# Patient Record
Sex: MALE | Race: WHITE | NOT HISPANIC OR LATINO | Employment: FULL TIME | RURAL
[De-identification: names, ages, dates, MRNs, and addresses within clinical notes are randomized per-mention and may not be internally consistent; named-entity substitution may affect disease eponyms.]

---

## 2019-10-08 ENCOUNTER — HISTORICAL (OUTPATIENT)
Dept: ADMINISTRATIVE | Facility: HOSPITAL | Age: 39
End: 2019-10-08

## 2019-10-09 LAB
LAB AP CLINICAL INFORMATION: NORMAL
LAB AP DIAGNOSIS - HISTORICAL: NORMAL
LAB AP GROSS PATHOLOGY - HISTORICAL: NORMAL
LAB AP SPECIMEN SUBMITTED - HISTORICAL: NORMAL

## 2020-06-10 ENCOUNTER — HISTORICAL (OUTPATIENT)
Dept: ADMINISTRATIVE | Facility: HOSPITAL | Age: 40
End: 2020-06-10

## 2021-04-13 DIAGNOSIS — R13.10 DYSPHAGIA, UNSPECIFIED TYPE: Primary | ICD-10-CM

## 2021-04-13 RX ORDER — PANTOPRAZOLE SODIUM 40 MG/1
40 TABLET, DELAYED RELEASE ORAL DAILY
Qty: 30 TABLET | Refills: 11 | Status: SHIPPED | OUTPATIENT
Start: 2021-04-13 | End: 2022-04-25 | Stop reason: SDUPTHER

## 2021-04-13 RX ORDER — PANTOPRAZOLE SODIUM 40 MG/1
40 TABLET, DELAYED RELEASE ORAL DAILY
COMMUNITY
Start: 2021-03-06 | End: 2021-04-13 | Stop reason: SDUPTHER

## 2021-08-09 RX ORDER — ESCITALOPRAM OXALATE 10 MG/1
10 TABLET ORAL DAILY
Qty: 90 TABLET | Refills: 0 | Status: SHIPPED | OUTPATIENT
Start: 2021-08-09 | End: 2021-10-18 | Stop reason: DRUGHIGH

## 2021-08-09 RX ORDER — ESCITALOPRAM OXALATE 10 MG/1
10 TABLET ORAL DAILY
COMMUNITY
Start: 2021-05-15 | End: 2021-08-09 | Stop reason: SDUPTHER

## 2021-10-14 PROBLEM — K21.9 GERD (GASTROESOPHAGEAL REFLUX DISEASE): Status: ACTIVE | Noted: 2018-02-26

## 2021-10-14 PROBLEM — M47.812 CERVICAL SPONDYLOSIS: Status: ACTIVE | Noted: 2020-06-10

## 2021-10-14 PROBLEM — F41.9 ANXIETY: Status: ACTIVE | Noted: 2018-03-16

## 2021-10-18 DIAGNOSIS — F41.9 ANXIETY: Primary | ICD-10-CM

## 2021-10-18 RX ORDER — ESCITALOPRAM OXALATE 10 MG/1
10 TABLET ORAL DAILY
Qty: 90 TABLET | Refills: 0 | Status: CANCELLED | OUTPATIENT
Start: 2021-10-18

## 2021-10-18 RX ORDER — ESCITALOPRAM OXALATE 20 MG/1
20 TABLET ORAL DAILY
Qty: 90 TABLET | Refills: 0 | Status: SHIPPED | OUTPATIENT
Start: 2021-10-18 | End: 2021-12-06 | Stop reason: SDUPTHER

## 2021-11-23 ENCOUNTER — HOSPITAL ENCOUNTER (OUTPATIENT)
Dept: RADIOLOGY | Facility: HOSPITAL | Age: 41
Discharge: HOME | End: 2021-11-23
Attending: NURSE PRACTITIONER
Payer: COMMERCIAL

## 2021-11-23 ENCOUNTER — OFFICE VISIT (OUTPATIENT)
Dept: FAMILY MEDICINE | Facility: CLINIC | Age: 41
End: 2021-11-23
Payer: COMMERCIAL

## 2021-11-23 ENCOUNTER — TELEPHONE (OUTPATIENT)
Dept: FAMILY MEDICINE | Facility: CLINIC | Age: 41
End: 2021-11-23
Payer: COMMERCIAL

## 2021-11-23 VITALS
WEIGHT: 256.38 LBS | BODY MASS INDEX: 31.22 KG/M2 | HEART RATE: 65 BPM | HEIGHT: 76 IN | SYSTOLIC BLOOD PRESSURE: 138 MMHG | DIASTOLIC BLOOD PRESSURE: 88 MMHG | RESPIRATION RATE: 17 BRPM | TEMPERATURE: 98 F | OXYGEN SATURATION: 98 %

## 2021-11-23 DIAGNOSIS — S29.9XXA RIB INJURY: ICD-10-CM

## 2021-11-23 DIAGNOSIS — S29.9XXA RIB INJURY: Primary | ICD-10-CM

## 2021-11-23 DIAGNOSIS — R07.81 RIB PAIN ON RIGHT SIDE: ICD-10-CM

## 2021-11-23 PROCEDURE — 71101 XR RIBS MIN 3 VIEWS W/ PA CHEST RIGHT: ICD-10-PCS | Mod: 26,RT,, | Performed by: RADIOLOGY

## 2021-11-23 PROCEDURE — 99202 PR OFFICE/OUTPT VISIT, NEW, LEVL II, 15-29 MIN: ICD-10-PCS | Mod: ,,, | Performed by: NURSE PRACTITIONER

## 2021-11-23 PROCEDURE — 71101 X-RAY EXAM UNILAT RIBS/CHEST: CPT | Mod: 26,RT,, | Performed by: RADIOLOGY

## 2021-11-23 PROCEDURE — 99202 OFFICE O/P NEW SF 15 MIN: CPT | Mod: ,,, | Performed by: NURSE PRACTITIONER

## 2021-11-23 PROCEDURE — 71101 X-RAY EXAM UNILAT RIBS/CHEST: CPT | Mod: TC,RT

## 2021-11-23 RX ORDER — TIZANIDINE 4 MG/1
4 TABLET ORAL EVERY 6 HOURS PRN
Qty: 30 TABLET | Refills: 0 | Status: SHIPPED | OUTPATIENT
Start: 2021-11-23 | End: 2021-12-03

## 2021-11-23 RX ORDER — DICLOFENAC SODIUM 75 MG/1
75 TABLET, DELAYED RELEASE ORAL 2 TIMES DAILY
Qty: 30 TABLET | Refills: 0 | Status: SHIPPED | OUTPATIENT
Start: 2021-11-23 | End: 2021-12-06

## 2021-12-06 ENCOUNTER — OFFICE VISIT (OUTPATIENT)
Dept: FAMILY MEDICINE | Facility: CLINIC | Age: 41
End: 2021-12-06
Payer: COMMERCIAL

## 2021-12-06 VITALS
WEIGHT: 258.63 LBS | TEMPERATURE: 97 F | RESPIRATION RATE: 20 BRPM | HEART RATE: 73 BPM | OXYGEN SATURATION: 98 % | SYSTOLIC BLOOD PRESSURE: 116 MMHG | DIASTOLIC BLOOD PRESSURE: 70 MMHG | HEIGHT: 74 IN | BODY MASS INDEX: 33.19 KG/M2

## 2021-12-06 DIAGNOSIS — J31.0 CHRONIC RHINITIS: ICD-10-CM

## 2021-12-06 DIAGNOSIS — K21.9 GASTROESOPHAGEAL REFLUX DISEASE WITHOUT ESOPHAGITIS: ICD-10-CM

## 2021-12-06 DIAGNOSIS — F41.9 ANXIETY: Primary | ICD-10-CM

## 2021-12-06 PROBLEM — S29.9XXA RIB INJURY: Status: RESOLVED | Noted: 2021-11-23 | Resolved: 2021-12-06

## 2021-12-06 PROBLEM — R07.81 RIB PAIN ON RIGHT SIDE: Status: RESOLVED | Noted: 2021-11-23 | Resolved: 2021-12-06

## 2021-12-06 PROCEDURE — 99213 OFFICE O/P EST LOW 20 MIN: CPT | Mod: ,,, | Performed by: NURSE PRACTITIONER

## 2021-12-06 PROCEDURE — 99213 PR OFFICE/OUTPT VISIT, EST, LEVL III, 20-29 MIN: ICD-10-PCS | Mod: ,,, | Performed by: NURSE PRACTITIONER

## 2021-12-06 RX ORDER — CETIRIZINE HYDROCHLORIDE 10 MG/1
10 TABLET ORAL DAILY
Qty: 90 TABLET | Refills: 3 | Status: SHIPPED | OUTPATIENT
Start: 2021-12-06 | End: 2022-10-12 | Stop reason: SDUPTHER

## 2021-12-06 RX ORDER — ESCITALOPRAM OXALATE 20 MG/1
20 TABLET ORAL DAILY
Qty: 90 TABLET | Refills: 1 | Status: SHIPPED | OUTPATIENT
Start: 2021-12-06 | End: 2022-07-20 | Stop reason: SDUPTHER

## 2022-03-24 ENCOUNTER — OFFICE VISIT (OUTPATIENT)
Dept: FAMILY MEDICINE | Facility: CLINIC | Age: 42
End: 2022-03-24
Payer: COMMERCIAL

## 2022-03-24 VITALS
RESPIRATION RATE: 20 BRPM | HEART RATE: 61 BPM | WEIGHT: 253.38 LBS | DIASTOLIC BLOOD PRESSURE: 64 MMHG | SYSTOLIC BLOOD PRESSURE: 116 MMHG | BODY MASS INDEX: 32.52 KG/M2 | OXYGEN SATURATION: 99 % | HEIGHT: 74 IN | TEMPERATURE: 98 F

## 2022-03-24 DIAGNOSIS — Z13.1 DIABETES MELLITUS SCREENING: ICD-10-CM

## 2022-03-24 DIAGNOSIS — Z23 NEED FOR DIPHTHERIA-TETANUS-PERTUSSIS (TDAP) VACCINE: ICD-10-CM

## 2022-03-24 DIAGNOSIS — Z11.59 NEED FOR HEPATITIS C SCREENING TEST: ICD-10-CM

## 2022-03-24 DIAGNOSIS — Z13.220 SCREENING FOR HYPERLIPIDEMIA: ICD-10-CM

## 2022-03-24 DIAGNOSIS — Z00.00 ROUTINE GENERAL MEDICAL EXAMINATION AT A HEALTH CARE FACILITY: Primary | ICD-10-CM

## 2022-03-24 DIAGNOSIS — F41.9 ANXIETY: ICD-10-CM

## 2022-03-24 LAB
CHOLEST SERPL-MCNC: 148 MG/DL (ref 0–200)
CHOLEST/HDLC SERPL: 4.4 {RATIO}
GLUCOSE SERPL-MCNC: 100 MG/DL (ref 74–106)
HCV AB SER QL: NORMAL
HDLC SERPL-MCNC: 34 MG/DL (ref 40–60)
LDLC SERPL CALC-MCNC: 88 MG/DL
LDLC/HDLC SERPL: 2.6 {RATIO}
NONHDLC SERPL-MCNC: 114 MG/DL
TRIGL SERPL-MCNC: 129 MG/DL (ref 35–150)
VLDLC SERPL-MCNC: 26 MG/DL

## 2022-03-24 PROCEDURE — 3074F PR MOST RECENT SYSTOLIC BLOOD PRESSURE < 130 MM HG: ICD-10-PCS | Mod: ,,, | Performed by: NURSE PRACTITIONER

## 2022-03-24 PROCEDURE — 80061 LIPID PANEL: ICD-10-PCS | Mod: ,,, | Performed by: CLINICAL MEDICAL LABORATORY

## 2022-03-24 PROCEDURE — 90715 TDAP VACCINE GREATER THAN OR EQUAL TO 7YO IM: ICD-10-PCS | Mod: ,,, | Performed by: NURSE PRACTITIONER

## 2022-03-24 PROCEDURE — 3078F PR MOST RECENT DIASTOLIC BLOOD PRESSURE < 80 MM HG: ICD-10-PCS | Mod: ,,, | Performed by: NURSE PRACTITIONER

## 2022-03-24 PROCEDURE — 90471 TDAP VACCINE GREATER THAN OR EQUAL TO 7YO IM: ICD-10-PCS | Mod: ,,, | Performed by: NURSE PRACTITIONER

## 2022-03-24 PROCEDURE — 1160F RVW MEDS BY RX/DR IN RCRD: CPT | Mod: ,,, | Performed by: NURSE PRACTITIONER

## 2022-03-24 PROCEDURE — 3008F PR BODY MASS INDEX (BMI) DOCUMENTED: ICD-10-PCS | Mod: ,,, | Performed by: NURSE PRACTITIONER

## 2022-03-24 PROCEDURE — 90471 IMMUNIZATION ADMIN: CPT | Mod: ,,, | Performed by: NURSE PRACTITIONER

## 2022-03-24 PROCEDURE — 99396 PREV VISIT EST AGE 40-64: CPT | Mod: 25,,, | Performed by: NURSE PRACTITIONER

## 2022-03-24 PROCEDURE — 80061 LIPID PANEL: CPT | Mod: ,,, | Performed by: CLINICAL MEDICAL LABORATORY

## 2022-03-24 PROCEDURE — 82947 GLUCOSE, FASTING: ICD-10-PCS | Mod: ,,, | Performed by: CLINICAL MEDICAL LABORATORY

## 2022-03-24 PROCEDURE — 1160F PR REVIEW ALL MEDS BY PRESCRIBER/CLIN PHARMACIST DOCUMENTED: ICD-10-PCS | Mod: ,,, | Performed by: NURSE PRACTITIONER

## 2022-03-24 PROCEDURE — 1159F PR MEDICATION LIST DOCUMENTED IN MEDICAL RECORD: ICD-10-PCS | Mod: ,,, | Performed by: NURSE PRACTITIONER

## 2022-03-24 PROCEDURE — 82947 ASSAY GLUCOSE BLOOD QUANT: CPT | Mod: ,,, | Performed by: CLINICAL MEDICAL LABORATORY

## 2022-03-24 PROCEDURE — 86803 HEPATITIS C AB TEST: CPT | Mod: ,,, | Performed by: CLINICAL MEDICAL LABORATORY

## 2022-03-24 PROCEDURE — 1159F MED LIST DOCD IN RCRD: CPT | Mod: ,,, | Performed by: NURSE PRACTITIONER

## 2022-03-24 PROCEDURE — 86803 HEPATITIS C ANTIBODY: ICD-10-PCS | Mod: ,,, | Performed by: CLINICAL MEDICAL LABORATORY

## 2022-03-24 PROCEDURE — 3008F BODY MASS INDEX DOCD: CPT | Mod: ,,, | Performed by: NURSE PRACTITIONER

## 2022-03-24 PROCEDURE — 99396 PR PREVENTIVE VISIT,EST,40-64: ICD-10-PCS | Mod: 25,,, | Performed by: NURSE PRACTITIONER

## 2022-03-24 PROCEDURE — 3078F DIAST BP <80 MM HG: CPT | Mod: ,,, | Performed by: NURSE PRACTITIONER

## 2022-03-24 PROCEDURE — 90715 TDAP VACCINE 7 YRS/> IM: CPT | Mod: ,,, | Performed by: NURSE PRACTITIONER

## 2022-03-24 PROCEDURE — 3074F SYST BP LT 130 MM HG: CPT | Mod: ,,, | Performed by: NURSE PRACTITIONER

## 2022-03-24 NOTE — PATIENT INSTRUCTIONS
Patient Education       Yearly Physical for Adults   About this topic   Most people do not want to be sick. Having a checkup each year with your doctor is one way to help you stay healthy. You may need to see your doctor more or less often. How often you need to go to the doctor depends on your age. Your family and medical history also play a role in how often you need to go to the doctor. Going to see your doctor on a routine basis can help you find problems early or even before they start. This may make it easier to treat or cure your problem.  General   Your doctor will talk about many things during your checkup. Your doctor may ask about:  Your medical and family history.  All the drugs you are taking. Be sure to include all prescription, over the counter, and herbal supplements. Tell the doctor if you have any drug allergy. Bring a list of drugs you take with you.  How you are feeling and if you are having any problems.  Risky behaviors like smoking, drinking alcohol, using illegal drugs, not wearing seatbelts, having unprotected sex, etc.  Your doctor will do a physical exam and may check your:  Height and weight  Blood pressure  Reflexes  Memory  Vision  Hearing  Your doctor may order:  Lab tests  ECG to check your heart rhythm  X-rays  Tests or treatments based on your exam  What lifestyle changes are needed?   Your doctor may suggest you make changes to your lifestyle at this visit. The doctor may talk with you about being more active or lowering stress levels. Ask your doctor what you need to do.  What drugs may be needed?   Your doctor may order drugs or vaccines to protect you from illnesses.  What changes to diet are needed?   Talk to your doctor to see if any changes are needed to your diet.  When do I need to call the doctor?   Call your doctor if you need to learn about any test results. Together you can make a plan for more care.  Helpful tips   Make a list of questions for your doctor before you  go. This will help you remember to ask about any concerns. Write down any answers from your doctor so you can look over them after your visit.   Tell your doctor about any changes in your body or health since your last visit.  Ask your doctor about any screening tests you need.  Where can I learn more?   American Academy of Family Physicians  http://familydoctor.org/familydoctor/en/prevention-wellness/staying-healthy/healthy-living/preventive-services-for-healthy-living.printerview.html   Centers for Disease Control  http://www.cdc.gov/family/checkup/   Last Reviewed Date   2019-04-22  Consumer Information Use and Disclaimer   This information is not specific medical advice and does not replace information you receive from your health care provider. This is only a brief summary of general information. It does NOT include all information about conditions, illnesses, injuries, tests, procedures, treatments, therapies, discharge instructions or life-style choices that may apply to you. You must talk with your health care provider for complete information about your health and treatment options. This information should not be used to decide whether or not to accept your health care providers advice, instructions or recommendations. Only your health care provider has the knowledge and training to provide advice that is right for you.  Copyright   Copyright © 2021 [employer] and its affiliates and/or licensors. All rights reserved.  Patient Education       Weight Loss Tips   About this topic   More and more people are concerned about their weight. You can choose from many different programs. The goal of a weight loss program may be to cut down on calories or to lose extra weight through exercise.  Losing weight may mean changing your ideas about food. Going on a diet and losing weight does not mean starving yourself. It means cutting down on the amount of food you eat, making healthy food choices, and being  "active.  General   Ideally, you need to lose 1 to 2 pounds (0.5 to 1 kg) a week for a healthy weight loss. Losing too much weight too fast is not good. When you take in fewer calories, you will lose weight. Your ideal calorie and weight goal depends on your current age, weight, height, and personal goals. Ask your doctor or dietitian what your ideal weight is.  You need to burn 3500 calories to lose 1 pound (0.5 kg). That means cutting out 500 calories every day for 7 days. You can cut out 500 calories per day by eating or drinking fewer calories, burning them through exercise, or doing both. To lose that extra weight and stay healthy:  Take time to exercise.  Exercise regularly. Burn calories with activity and exercise. Exercise can help you lose weight and it also strengthens your muscles. Set a schedule where you will have time to do exercises. With just 30 to 60 minutes of exercise each day, you could burn 500 extra calories. Your metabolism stays elevated for a period of time after exercise.  If you don't have time for a 30 minute workout, try three 10 minute exercises each day.  If you work near your home, walk to work. Walking is a very good form of exercise.  Take a 20 minute walk each day. Walk during your lunch break. Park far away, so you have to walk more.  Take the steps instead of elevators. You will burn more calories this way.  If you have an illness, like diabetes or high blood pressure, ask your doctor how much exercise is right for you.  Choose healthy snacks.  Low calorie healthy snacks are a good thing. They help your blood sugar stay even and prevent you from overeating at meals. Choose a balanced snack, such as a small apple with 2 tablespoons (30 grams) of peanut butter.  Keep in mind, even "low calorie" foods can add up. Just because you choose low calorie foods does not mean you do not have to count the calories you eat.  Pack a few fresh fruits or a small salad to take to work or school. " Avoid buying a snack at the nearest vending machine.  When you feel thirsty, drink water. Water has no calories and is a very good thirst quencher.  Plan healthy meals.  Plan ahead. Keep a diary of foods that are low in calories. You can also make a list of meal plans for your breakfast, lunch, and dinner. Planning ahead will prevent you from eating out at a fast food place or restaurant.  Make a grocery list before shopping so you only buy food you need. Don't go to the store hungry.  Visit a dietitian. This person will help you make meal plans that will help you lose weight.  Add fiber to your meals. Adding fiber helps you to feel full for a longer amount of time.  Take care when eating out.  Choose lower fat and lower calorie meals. Try a seafood, lean meat, or vegetarian entrée.  Share a meal with a friend.  Try a salad and appetizer instead of an entree.  Ask for a to-go box when dinner is served and put half of your meal in it for a later meal.  Have fruit for dessert.  Drink water instead of other high calorie drinks.  Learn not to overeat.  Watch your portions. For example, the recommended serving size of meat is 3 ounces (90 grams). This is the size of a deck of playing cards. Two tablespoons (30 grams) of peanut butter is the size of a ping-pong ball. One medium fruit is the size of a baseball.  Use a smaller plate or glass during dinner for less calorie intake.  Try drinking a glass or two of water before eating. This may make you feel more full and help you to eat less.  Eat slowly. Take at least 30 minutes to eat. This gives time for your brain to tell your stomach you are full. This will help you avoid overeating.  Some people eat smaller meals more often to help not to overeat. If you can eat six small meals, make them healthy and low calorie. If three meals are best for you, know your calorie level for the day and spread it out into three healthy low calorie meals.     What will the results be?    Losing weight may make you healthier. You also may have more energy for your daily activities. You may lower disease risk. You may also add years to your life.  What changes to diet are needed?   Learn how to read nutrition labels. Know the serving size. Knowing the calories in an item will help you make healthier choices and lose weight.  Keep a diary of the food you eat. This will help you count the calories you are taking in.  Make a menu in advance. This will help you make good choices to include in your diet.  Avoid eating 2 hours before bedtime to allow for digestion. If you eat right before you go to bed, you may also have worse heartburn.  Be sure to count the calories in the things you drink. You may want to stop drinking soda pop, beer, wine, and mixed drinks (alcohol). Some coffee drinks also have a lot of calories in them.  Who should use this diet?   A weight loss diet may be needed for people with a calculated body mass index of 25 and over. This means you are overweight or obese.  Who should not use this diet?   People with BMI of 18.5 or lower should not use this diet. Do not use this diet if your doctor does not recommend weight loss.  What foods are good to eat?   Choose foods that are nutritious. Remember, portion control is key. Even a low calorie food can become high in calories if you have too big of a serving. Here is a list of foods that are good to eat:  Vegetables:   Broccoli  Asparagus  Spinach  Green leafy vegetables  Tomatoes  Onions  Mushrooms  Cucumbers  Zucchini  Lean proteins:   Egg whites  Beans including kidney, navy, black, and chickpeas  Grilled, broiled, or baked skinless chicken breast  Grilled, broiled, or baked skinless turkey breast  Lean beef  [place] meat  Grilled, broiled, or baked fish  Beans  Nuts, such as almonds, cashews, and pistachios  Seeds  Whole grains and carbs:   Oatmeal  Brown rice  Sweet potatoes  Cereal  Whole grain bread or pasta  Fruits:    Apples  Grapefruit  Blueberries  Oranges  Bananas  Grapes  Peaches  Pineapple  Strawberries  Dairy:   Fat-free or low-fat milk and cheese  Low fat yogurt  Soy, rice, or almond milk  What foods should be limited or avoided?   Limit or avoid foods that are high in calories like:  Junk foods  Fried foods  Fatty foods  Processed meats  Food with saturated and trans fat  Whole fat dairy products  Butter  Cheese  Ice cream  Food and drinks with a lot of sugar. Some examples are beer, wine, mixed drinks (alcohol), carbonated sodas, cakes, and cookies.  When do I need to call the doctor?   Weakness  Fast heartbeat  Dizziness  Helpful tips   Join a support group and an exercise group. It is much easier to lose weight if you have support and encouragement.  Do not skip meals. If you skip a meal, you most likely will overeat at that next meal.  Eat at the dining room table instead in front of the TV to help monitor intake.  Where can I learn more?   Academy of Nutrition and Dietetics  https://www.eatright.org/health/weight-loss/your-health-and-your-weight/back-to-basics-for-healthy-weight-loss   Centers for Disease Control and Prevention  https://www.cdc.gov/healthyweight/   Weight-Control Information Network  https://www.niddk.nih.gov/health-information/diet-nutrition/changing-habits-better-health   Last Reviewed Date   2021-08-09  Consumer Information Use and Disclaimer   This information is not specific medical advice and does not replace information you receive from your health care provider. This is only a brief summary of general information. It does NOT include all information about conditions, illnesses, injuries, tests, procedures, treatments, therapies, discharge instructions or life-style choices that may apply to you. You must talk with your health care provider for complete information about your health and treatment options. This information should not be used to decide whether or not to accept your health care  providers advice, instructions or recommendations. Only your health care provider has the knowledge and training to provide advice that is right for you.  Copyright   Copyright © 2021 [employer] and its affiliates and/or licensors. All rights reserved.

## 2022-03-24 NOTE — PROGRESS NOTES
[facility] - FAMILY MEDICINE       PATIENT NAME: Gerald Montague   : 1980    AGE: 42 y.o. DATE: 2022    MRN: [id number]        Reason for Visit / Chief Complaint:  Annual Exam (Pt presents for [facility]. He is not fasting.), Anxiety, and Gastroesophageal Reflux     Subjective:     HPI:  Presents for [facility] wellness visit.  F/u anxiety and GERD  Doing well with higher dose of lexapro.    PHQ9 3/24/2022   Total Score 2       Review of Systems:     Review of Systems   Constitutional: Negative.    HENT: Negative.    Eyes: Negative.    Respiratory: Negative.    Cardiovascular: Negative.    Gastrointestinal: Negative.    Endocrine: Negative.    Genitourinary: Negative.    Musculoskeletal: Negative.    Skin: Negative.    Allergic/Immunologic: Negative.    Neurological: Negative.    Hematological: Negative.    Psychiatric/Behavioral: Negative.        Allergies and Medications:     Review of patient's allergies indicates:  No Known Allergies     Current Outpatient Medications on File Prior to Visit   Medication Sig Dispense Refill    cetirizine (ZYRTEC) 10 MG tablet Take 1 tablet (10 mg total) by mouth once daily. 90 tablet 3    EScitalopram oxalate (LEXAPRO) 20 MG tablet Take 1 tablet (20 mg total) by mouth once daily. 90 tablet 1    pantoprazole (PROTONIX) 40 MG tablet Take 1 tablet (40 mg total) by mouth once daily. 30 tablet 11     No current facility-administered medications on file prior to visit.       Labs:    none    Medical/Social/Family History:     Past Medical History:   Diagnosis Date    Anxiety 2018    Cervical spondylosis 06/10/2020    noted on CT neck    Dysphagia     GERD (gastroesophageal reflux disease) 2018    Globus sensation     History of COVID-19 2020    Other long term (current) drug therapy     Smokeless tobacco use       Social History     Tobacco Use   Smoking Status Never Smoker   Smokeless  "Tobacco Current User      Social History     Substance and Sexual Activity   Alcohol Use Not Currently       Family History   Problem Relation Age of Onset    No Known Problems Mother     Diabetes Father     Hypertension Father     Arthritis Sister     No Known Problems Brother     No Known Problems Daughter     Throat cancer Maternal Grandmother     No Known Problems Maternal Grandfather     No Known Problems Paternal Grandmother     No Known Problems Paternal Grandfather       History reviewed. No pertinent surgical history.     Health Maintenance:     Immunization History   Administered Date(s) Administered    Tdap 03/24/2022      Health Maintenance Due   Topic Date Due    Hepatitis C Screening  Never done    Lipid Panel  Never done    Eye Exam  Never done     Health Maintenance Topics with due status: Not Due       Topic Last Completion Date    TETANUS VACCINE 03/24/2022       Objective:      Wt Readings from Last 3 Encounters:   03/24/22 1106 114.9 kg (253 lb 6.4 oz)   12/06/21 0958 117.3 kg (258 lb 9.6 oz)   11/23/21 1440 116.3 kg (256 lb 6.4 oz)     Vitals:    03/24/22 1106   BP: 116/64   BP Location: Right arm   Patient Position: Sitting   BP Method: Large (Manual)   Pulse: 61   Resp: 20   Temp: 97.6 °F (36.4 °C)   TempSrc: Temporal   SpO2: 99%   Weight: 114.9 kg (253 lb 6.4 oz)   Height: 6' 2" (1.88 m)     Body mass index is 32.53 kg/m².     Physical Exam:    Physical Exam  Vitals and nursing note reviewed.   Constitutional:       Appearance: Normal appearance. He is obese.   HENT:      Head: Normocephalic.      Right Ear: Tympanic membrane, ear canal and external ear normal.      Left Ear: Tympanic membrane, ear canal and external ear normal.      Nose: Nose normal.      Mouth/Throat:      Mouth: Mucous membranes are moist.      Pharynx: Oropharynx is clear.   Eyes:      Conjunctiva/sclera: Conjunctivae normal.      Pupils: Pupils are equal, round, and reactive to light.   Neck:      Thyroid: " No thyromegaly.      Vascular: Normal carotid pulses. No carotid bruit.   Cardiovascular:      Rate and Rhythm: Normal rate and regular rhythm.      Pulses: Normal pulses.      Heart sounds: Normal heart sounds.   Pulmonary:      Effort: Pulmonary effort is normal.      Breath sounds: Normal breath sounds.   Abdominal:      Palpations: Abdomen is soft.      Tenderness: There is no abdominal tenderness.   Musculoskeletal:      Cervical back: Neck supple.      Right lower leg: No edema.      Left lower leg: No edema.   Lymphadenopathy:      Cervical: No cervical adenopathy.   Skin:     General: Skin is warm and dry.   Neurological:      General: No focal deficit present.      Mental Status: He is alert and oriented to person, place, and time.   Psychiatric:         Mood and Affect: Mood normal.         Behavior: Behavior normal.          Assessment:          ICD-10-CM ICD-9-CM   1. Routine general medical examination at a health care facility  Z00.00 V70.0   2. Screening for hyperlipidemia  Z13.220 V77.91   3. Diabetes mellitus screening  Z13.1 V77.1   4. Anxiety  F41.9 300.00   5. Need for hepatitis C screening test  Z11.59 V73.89   6. Need for diphtheria-tetanus-pertussis (Tdap) vaccine  Z23 V06.1        Plan:       Routine general medical examination at a health care facility    Screening for hyperlipidemia  -     Lipid Panel; Future; Expected date: 03/24/2022    Diabetes mellitus screening  -     Glucose, Fasting; Future; Expected date: 03/24/2022    Anxiety    Need for hepatitis C screening test  -     Hepatitis C Antibody; Future; Expected date: 03/24/2022    Need for diphtheria-tetanus-pertussis (Tdap) vaccine  -     Tdap Vaccine        Current Outpatient Medications:     cetirizine (ZYRTEC) 10 MG tablet, Take 1 tablet (10 mg total) by mouth once daily., Disp: 90 tablet, Rfl: 3    EScitalopram oxalate (LEXAPRO) 20 MG tablet, Take 1 tablet (20 mg total) by mouth once daily., Disp: 90 tablet, Rfl: 1     pantoprazole (PROTONIX) 40 MG tablet, Take 1 tablet (40 mg total) by mouth once daily., Disp: 30 tablet, Rfl: 11      New & refilled meds:  Requested Prescriptions      No prescriptions requested or ordered in this encounter     Health goals to improve overall health and well-being:  BMI < 30 - lose 1-2 lbs per week, healthy/DASH diet, exercise at least 5 days per week  fasting glucose < 100  SBP < 130 & DBP < 80  LDL chol < 100  Smokeless tobacco cessation advised.  TDAP    Med refills when needed.    F/u anxiety in 6 mths. HY w/ fasting labs in 1 yr.     Future Appointments   Date Time Provider Department Center   9/29/2022  8:40 AM [facility]AN [facility]   3/27/2023  8:20 AM [facility]AN Pearce [facility]        Signature:  SUMAN Pearce [facility] - FAMILY MEDICINE    Date of encounter: 3/24/22

## 2022-04-25 DIAGNOSIS — R13.10 DYSPHAGIA, UNSPECIFIED TYPE: ICD-10-CM

## 2022-04-25 RX ORDER — PANTOPRAZOLE SODIUM 40 MG/1
40 TABLET, DELAYED RELEASE ORAL DAILY
Qty: 30 TABLET | Refills: 3 | Status: SHIPPED | OUTPATIENT
Start: 2022-04-25 | End: 2022-09-01 | Stop reason: SDUPTHER

## 2022-04-25 NOTE — TELEPHONE ENCOUNTER
Patient wife called stating that patient needs refills of protonix 40mg daily.      ----- Message from Lyric Vela sent at 4/25/2022  8:34 AM CDT -----  Out of Protonix 40mg [phone]

## 2022-07-20 DIAGNOSIS — F41.9 ANXIETY: ICD-10-CM

## 2022-07-20 RX ORDER — ESCITALOPRAM OXALATE 20 MG/1
20 TABLET ORAL DAILY
Qty: 90 TABLET | Refills: 1 | Status: SHIPPED | OUTPATIENT
Start: 2022-07-20 | End: 2022-10-12 | Stop reason: SDUPTHER

## 2022-07-20 NOTE — TELEPHONE ENCOUNTER
----- Message from Otilia Childs sent at 7/20/2022  8:46 AM CDT -----  Patient needs a refill on lexapro called into [facility]  pharmacy.   Please call patient at [phone]  if you have any questions. Thanks!

## 2022-09-01 DIAGNOSIS — R13.10 DYSPHAGIA, UNSPECIFIED TYPE: ICD-10-CM

## 2022-09-01 RX ORDER — PANTOPRAZOLE SODIUM 40 MG/1
40 TABLET, DELAYED RELEASE ORAL DAILY
Qty: 30 TABLET | Refills: 3 | Status: SHIPPED | OUTPATIENT
Start: 2022-09-01 | End: 2023-01-09 | Stop reason: SDUPTHER

## 2022-09-01 NOTE — TELEPHONE ENCOUNTER
----- Message from Lyric Vela sent at 9/1/2022  1:18 PM CDT -----  Almost out of Proton[employer] Wind  .uses  [facility]  [phone]

## 2022-10-12 ENCOUNTER — OFFICE VISIT (OUTPATIENT)
Dept: FAMILY MEDICINE | Facility: CLINIC | Age: 42
End: 2022-10-12
Payer: COMMERCIAL

## 2022-10-12 VITALS
TEMPERATURE: 98 F | HEIGHT: 74 IN | RESPIRATION RATE: 20 BRPM | WEIGHT: 265 LBS | HEART RATE: 67 BPM | BODY MASS INDEX: 34.01 KG/M2 | OXYGEN SATURATION: 98 % | SYSTOLIC BLOOD PRESSURE: 124 MMHG | DIASTOLIC BLOOD PRESSURE: 86 MMHG

## 2022-10-12 DIAGNOSIS — F41.9 ANXIETY: Primary | Chronic | ICD-10-CM

## 2022-10-12 DIAGNOSIS — J31.0 CHRONIC RHINITIS: ICD-10-CM

## 2022-10-12 DIAGNOSIS — K21.9 GASTROESOPHAGEAL REFLUX DISEASE WITHOUT ESOPHAGITIS: Chronic | ICD-10-CM

## 2022-10-12 DIAGNOSIS — Z23 NEED FOR INFLUENZA VACCINATION: ICD-10-CM

## 2022-10-12 PROCEDURE — 1160F PR REVIEW ALL MEDS BY PRESCRIBER/CLIN PHARMACIST DOCUMENTED: ICD-10-PCS | Mod: ,,, | Performed by: NURSE PRACTITIONER

## 2022-10-12 PROCEDURE — 3008F BODY MASS INDEX DOCD: CPT | Mod: ,,, | Performed by: NURSE PRACTITIONER

## 2022-10-12 PROCEDURE — 1159F MED LIST DOCD IN RCRD: CPT | Mod: ,,, | Performed by: NURSE PRACTITIONER

## 2022-10-12 PROCEDURE — 90471 FLU VACCINE (QUAD) GREATER THAN OR EQUAL TO 3YO PRESERVATIVE FREE IM: ICD-10-PCS | Mod: ,,, | Performed by: NURSE PRACTITIONER

## 2022-10-12 PROCEDURE — 90471 IMMUNIZATION ADMIN: CPT | Mod: ,,, | Performed by: NURSE PRACTITIONER

## 2022-10-12 PROCEDURE — 1159F PR MEDICATION LIST DOCUMENTED IN MEDICAL RECORD: ICD-10-PCS | Mod: ,,, | Performed by: NURSE PRACTITIONER

## 2022-10-12 PROCEDURE — 3074F PR MOST RECENT SYSTOLIC BLOOD PRESSURE < 130 MM HG: ICD-10-PCS | Mod: ,,, | Performed by: NURSE PRACTITIONER

## 2022-10-12 PROCEDURE — 90686 IIV4 VACC NO PRSV 0.5 ML IM: CPT | Mod: ,,, | Performed by: NURSE PRACTITIONER

## 2022-10-12 PROCEDURE — 1160F RVW MEDS BY RX/DR IN RCRD: CPT | Mod: ,,, | Performed by: NURSE PRACTITIONER

## 2022-10-12 PROCEDURE — 99213 PR OFFICE/OUTPT VISIT, EST, LEVL III, 20-29 MIN: ICD-10-PCS | Mod: 25,,, | Performed by: NURSE PRACTITIONER

## 2022-10-12 PROCEDURE — 3079F PR MOST RECENT DIASTOLIC BLOOD PRESSURE 80-89 MM HG: ICD-10-PCS | Mod: ,,, | Performed by: NURSE PRACTITIONER

## 2022-10-12 PROCEDURE — 3008F PR BODY MASS INDEX (BMI) DOCUMENTED: ICD-10-PCS | Mod: ,,, | Performed by: NURSE PRACTITIONER

## 2022-10-12 PROCEDURE — 90686 FLU VACCINE (QUAD) GREATER THAN OR EQUAL TO 3YO PRESERVATIVE FREE IM: ICD-10-PCS | Mod: ,,, | Performed by: NURSE PRACTITIONER

## 2022-10-12 PROCEDURE — 3074F SYST BP LT 130 MM HG: CPT | Mod: ,,, | Performed by: NURSE PRACTITIONER

## 2022-10-12 PROCEDURE — 99213 OFFICE O/P EST LOW 20 MIN: CPT | Mod: 25,,, | Performed by: NURSE PRACTITIONER

## 2022-10-12 PROCEDURE — 3079F DIAST BP 80-89 MM HG: CPT | Mod: ,,, | Performed by: NURSE PRACTITIONER

## 2022-10-12 RX ORDER — ESCITALOPRAM OXALATE 20 MG/1
20 TABLET ORAL DAILY
Qty: 90 TABLET | Refills: 1 | Status: SHIPPED | OUTPATIENT
Start: 2022-10-12 | End: 2023-04-17 | Stop reason: SDUPTHER

## 2022-10-12 RX ORDER — CETIRIZINE HYDROCHLORIDE 10 MG/1
10 TABLET ORAL DAILY
Qty: 90 TABLET | Refills: 3 | Status: SHIPPED | OUTPATIENT
Start: 2022-10-12 | End: 2022-12-19 | Stop reason: SDUPTHER

## 2022-10-12 NOTE — PROGRESS NOTES
"   [facility]       PATIENT NAME: Gerald Montague   : 1980    AGE: 42 y.o. DATE OF ENCOUNTER: 10/12/22    MRN: [id number]        Reason for Visit / Chief Complaint:  Follow-up (Pt presents for 6 month anxiety follow up.) and Anxiety     Subjective:     HPI:    Presents for 6 mth f/u anxiety. No probs.  Needs refills zyrtec for chronic rhinitis.    Has gained 12 lbs in the past 6 mths    Review of Systems:     Review of Systems   Constitutional: Negative.    HENT: Negative.     Eyes: Negative.    Respiratory: Negative.     Cardiovascular: Negative.    Gastrointestinal: Negative.    Endocrine: Negative.    Genitourinary: Negative.    Musculoskeletal: Negative.    Skin: Negative.    Allergic/Immunologic: Negative.    Neurological: Negative.    Hematological: Negative.    Psychiatric/Behavioral: Negative.       Allergies:     Review of patient's allergies indicates:  No Known Allergies     Medical History:     Past Medical History:   Diagnosis Date    Anxiety 2018    Cervical spondylosis 06/10/2020    noted on CT neck    Dysphagia     GERD (gastroesophageal reflux disease) 2018    Globus sensation     History of COVID-19 2020    Other long term (current) drug therapy     Smokeless tobacco use       Social History     Tobacco Use   Smoking Status Never   Smokeless Tobacco Current      History reviewed. No pertinent surgical history.     Objective:      Wt Readings from Last 3 Encounters:   10/12/22 1438 120.2 kg (265 lb)   22 1106 114.9 kg (253 lb 6.4 oz)   21 0958 117.3 kg (258 lb 9.6 oz)     Vitals:    10/12/22 1438   BP: 124/86   BP Location: Left arm   Patient Position: Sitting   BP Method: Large (Manual)   Pulse: 67   Resp: 20   Temp: 98.3 °F (36.8 °C)   TempSrc: Oral   SpO2: 98%   Weight: 120.2 kg (265 lb)   Height: 6' 2" (1.88 m)     Body mass index is 34.02 kg/m².     Physical Exam:    Physical Exam  Vitals and nursing note reviewed. "   Constitutional:       General: He is not in acute distress.     Appearance: Normal appearance.   HENT:      Head: Normocephalic.      Right Ear: Tympanic membrane, ear canal and external ear normal.      Left Ear: Tympanic membrane, ear canal and external ear normal.      Nose: Nose normal.      Mouth/Throat:      Mouth: Mucous membranes are moist.      Pharynx: Oropharynx is clear.   Eyes:      Conjunctiva/sclera: Conjunctivae normal.      Pupils: Pupils are equal, round, and reactive to light.   Neck:      Thyroid: No thyromegaly.      Vascular: Normal carotid pulses.   Cardiovascular:      Rate and Rhythm: Normal rate and regular rhythm.      Pulses: Normal pulses.      Heart sounds: Normal heart sounds.   Pulmonary:      Effort: Pulmonary effort is normal. No respiratory distress.      Breath sounds: Normal breath sounds.   Musculoskeletal:      Cervical back: Neck supple.   Lymphadenopathy:      Cervical: No cervical adenopathy.   Skin:     General: Skin is warm and dry.   Neurological:      General: No focal deficit present.      Mental Status: He is alert and oriented to person, place, and time.   Psychiatric:         Mood and Affect: Mood normal.         Behavior: Behavior normal.        Assessment:          ICD-10-CM ICD-9-CM   1. Anxiety  F41.9 300.00   2. Need for influenza vaccination  Z23 V04.81   3. Gastroesophageal reflux disease without esophagitis  K21.9 530.81   4. Chronic rhinitis  J31.0 472.0        Plan:       Anxiety  -     EScitalopram oxalate (LEXAPRO) 20 MG tablet; Take 1 tablet (20 mg total) by mouth once daily.  Dispense: 90 tablet; Refill: 1    Need for influenza vaccination  -     Influenza - Quadrivalent *Preferred* (6 months+) (PF)    Gastroesophageal reflux disease without esophagitis  Comments:  controlled w/ protonix    Chronic rhinitis  -     cetirizine (ZYRTEC) 10 MG tablet; Take 1 tablet (10 mg total) by mouth once daily.  Dispense: 90 tablet; Refill: 3      Current Outpatient  Medications:     pantoprazole (PROTONIX) 40 MG tablet, Take 1 tablet (40 mg total) by mouth once daily., Disp: 30 tablet, Rfl: 3    cetirizine (ZYRTEC) 10 MG tablet, Take 1 tablet (10 mg total) by mouth once daily., Disp: 90 tablet, Rfl: 3    EScitalopram oxalate (LEXAPRO) 20 MG tablet, Take 1 tablet (20 mg total) by mouth once daily., Disp: 90 tablet, Rfl: 1    New & refilled meds:  Requested Prescriptions     Signed Prescriptions Disp Refills    EScitalopram oxalate (LEXAPRO) 20 MG tablet 90 tablet 1     Sig: Take 1 tablet (20 mg total) by mouth once daily.    cetirizine (ZYRTEC) 10 MG tablet 90 tablet 3     Sig: Take 1 tablet (10 mg total) by mouth once daily.     Discussed preHTN with goal of SBP < 130 and DBP < 80.  DASH diet, exercise, goal to lose 1-2 lbs per week.     Current treatment plan is effective, no change in therapy.  Flu shot today    Return to clinic HY w/ fasting labs in March as scheduled; and f/u as needed.    Future Appointments   Date Time Provider Department Center   3/27/2023  8:20 AM SUMAN Pearce [facility]        Signature:  SUMAN Pearce

## 2022-12-19 ENCOUNTER — TELEPHONE (OUTPATIENT)
Dept: FAMILY MEDICINE | Facility: CLINIC | Age: 42
End: 2022-12-19
Payer: COMMERCIAL

## 2022-12-19 DIAGNOSIS — J31.0 CHRONIC RHINITIS: ICD-10-CM

## 2022-12-19 RX ORDER — CETIRIZINE HYDROCHLORIDE 10 MG/1
10 TABLET ORAL DAILY
Qty: 90 TABLET | Refills: 3 | Status: SHIPPED | OUTPATIENT
Start: 2022-12-19 | End: 2024-01-21

## 2022-12-19 NOTE — TELEPHONE ENCOUNTER
----- Message from Ivone Greenberg sent at 12/19/2022  1:20 PM CST -----  Pt need refill on [facility] sent [facility]  Melissa PT # [phone]

## 2022-12-19 NOTE — TELEPHONE ENCOUNTER
Sent 1 yr refills in October but sent it again in case it didn't go through.  Msg to HP to let pt know.

## 2023-01-09 DIAGNOSIS — R13.10 DYSPHAGIA, UNSPECIFIED TYPE: ICD-10-CM

## 2023-01-09 RX ORDER — PANTOPRAZOLE SODIUM 40 MG/1
40 TABLET, DELAYED RELEASE ORAL DAILY
Qty: 30 TABLET | Refills: 3 | Status: SHIPPED | OUTPATIENT
Start: 2023-01-09 | End: 2023-05-31 | Stop reason: SDUPTHER

## 2023-01-09 NOTE — TELEPHONE ENCOUNTER
----- Message from Lyric Vela sent at 1/9/2023  8:26 AM CST -----  Out of [facility], send to [facility] [place]

## 2023-04-17 DIAGNOSIS — F41.9 ANXIETY: Chronic | ICD-10-CM

## 2023-04-17 RX ORDER — ESCITALOPRAM OXALATE 20 MG/1
20 TABLET ORAL DAILY
Qty: 90 TABLET | Refills: 1 | Status: SHIPPED | OUTPATIENT
Start: 2023-04-17 | End: 2023-10-05

## 2023-04-17 NOTE — TELEPHONE ENCOUNTER
----- Message from Riddhi Rodriguez sent at 4/17/2023  8:32 AM CDT -----  LEXAPRO W/M [facility] PT [phone]

## 2023-05-31 DIAGNOSIS — K21.9 GASTROESOPHAGEAL REFLUX DISEASE WITHOUT ESOPHAGITIS: Primary | Chronic | ICD-10-CM

## 2023-05-31 DIAGNOSIS — R13.10 DYSPHAGIA, UNSPECIFIED TYPE: ICD-10-CM

## 2023-05-31 RX ORDER — PANTOPRAZOLE SODIUM 40 MG/1
40 TABLET, DELAYED RELEASE ORAL DAILY
Qty: 90 TABLET | Refills: 0 | Status: SHIPPED | OUTPATIENT
Start: 2023-05-31 | End: 2023-08-28 | Stop reason: SDUPTHER

## 2023-07-26 ENCOUNTER — PATIENT OUTREACH (OUTPATIENT)
Dept: ADMINISTRATIVE | Facility: HOSPITAL | Age: 43
End: 2023-07-26

## 2023-08-28 DIAGNOSIS — K21.9 GASTROESOPHAGEAL REFLUX DISEASE WITHOUT ESOPHAGITIS: Chronic | ICD-10-CM

## 2023-08-28 RX ORDER — PANTOPRAZOLE SODIUM 40 MG/1
40 TABLET, DELAYED RELEASE ORAL DAILY
Qty: 90 TABLET | Refills: 0 | Status: SHIPPED | OUTPATIENT
Start: 2023-08-28 | End: 2023-11-20 | Stop reason: SDUPTHER

## 2023-08-28 NOTE — TELEPHONE ENCOUNTER
Sent 90 days but he needs an appointment asap.  I have not seen him since October 2022 and he has missed multiple appointments since then due to cancellations or no-show.

## 2023-08-28 NOTE — TELEPHONE ENCOUNTER
----- Message from Ivone Greenberg sent at 8/28/2023  9:43 AM CDT -----  PT need refill on [facility] Pt # [phone]

## 2023-10-05 DIAGNOSIS — F41.9 ANXIETY: Chronic | ICD-10-CM

## 2023-10-05 RX ORDER — ESCITALOPRAM OXALATE 20 MG/1
20 TABLET ORAL DAILY
Qty: 30 TABLET | Refills: 0 | Status: SHIPPED | OUTPATIENT
Start: 2023-10-05 | End: 2023-11-20 | Stop reason: SDUPTHER

## 2023-11-20 ENCOUNTER — OFFICE VISIT (OUTPATIENT)
Dept: FAMILY MEDICINE | Facility: CLINIC | Age: 43
End: 2023-11-20
Payer: COMMERCIAL

## 2023-11-20 VITALS
DIASTOLIC BLOOD PRESSURE: 82 MMHG | HEART RATE: 60 BPM | RESPIRATION RATE: 20 BRPM | SYSTOLIC BLOOD PRESSURE: 138 MMHG | OXYGEN SATURATION: 99 % | WEIGHT: 237.63 LBS | TEMPERATURE: 98 F | HEIGHT: 74 IN | BODY MASS INDEX: 30.5 KG/M2

## 2023-11-20 DIAGNOSIS — K21.9 GASTROESOPHAGEAL REFLUX DISEASE WITHOUT ESOPHAGITIS: Chronic | ICD-10-CM

## 2023-11-20 DIAGNOSIS — Z13.220 SCREENING FOR HYPERLIPIDEMIA: ICD-10-CM

## 2023-11-20 DIAGNOSIS — Z13.1 DIABETES MELLITUS SCREENING: ICD-10-CM

## 2023-11-20 DIAGNOSIS — F41.9 ANXIETY: Chronic | ICD-10-CM

## 2023-11-20 DIAGNOSIS — G25.81 RESTLESS LEGS: Primary | ICD-10-CM

## 2023-11-20 DIAGNOSIS — Z79.899 ENCOUNTER FOR LONG-TERM (CURRENT) USE OF OTHER MEDICATIONS: ICD-10-CM

## 2023-11-20 LAB
ALBUMIN SERPL BCP-MCNC: 4.4 G/DL (ref 3.5–5)
ALBUMIN/GLOB SERPL: 1.2 {RATIO}
ALP SERPL-CCNC: 71 U/L (ref 45–115)
ALT SERPL W P-5'-P-CCNC: 35 U/L (ref 16–61)
ANION GAP SERPL CALCULATED.3IONS-SCNC: 6 MMOL/L (ref 7–16)
AST SERPL W P-5'-P-CCNC: 22 U/L (ref 15–37)
BASOPHILS # BLD AUTO: 0.05 K/UL (ref 0–0.2)
BASOPHILS NFR BLD AUTO: 0.7 % (ref 0–1)
BILIRUB SERPL-MCNC: 0.7 MG/DL (ref ?–1.2)
BUN SERPL-MCNC: 12 MG/DL (ref 7–18)
BUN/CREAT SERPL: 12 (ref 6–20)
CALCIUM SERPL-MCNC: 9.2 MG/DL (ref 8.5–10.1)
CHLORIDE SERPL-SCNC: 105 MMOL/L (ref 98–107)
CHOLEST SERPL-MCNC: 154 MG/DL (ref 0–200)
CHOLEST/HDLC SERPL: 5.9 {RATIO}
CO2 SERPL-SCNC: 31 MMOL/L (ref 21–32)
CREAT SERPL-MCNC: 0.97 MG/DL (ref 0.7–1.3)
DIFFERENTIAL METHOD BLD: ABNORMAL
EGFR (NO RACE VARIABLE) (RUSH/TITUS): 99 ML/MIN/1.73M2
EOSINOPHIL # BLD AUTO: 0.08 K/UL (ref 0–0.5)
EOSINOPHIL NFR BLD AUTO: 1.1 % (ref 1–4)
ERYTHROCYTE [DISTWIDTH] IN BLOOD BY AUTOMATED COUNT: 14 % (ref 11.5–14.5)
EST. AVERAGE GLUCOSE BLD GHB EST-MCNC: 120 MG/DL
FERRITIN SERPL-MCNC: 313 NG/ML (ref 26–388)
GLOBULIN SER-MCNC: 3.6 G/DL (ref 2–4)
GLUCOSE SERPL-MCNC: 110 MG/DL (ref 74–106)
HBA1C MFR BLD HPLC: 5.8 % (ref 4.5–6.6)
HCT VFR BLD AUTO: 49 % (ref 40–54)
HDLC SERPL-MCNC: 26 MG/DL (ref 40–60)
HGB BLD-MCNC: 16.6 G/DL (ref 13.5–18)
IMM GRANULOCYTES # BLD AUTO: 0.03 K/UL (ref 0–0.04)
IMM GRANULOCYTES NFR BLD: 0.4 % (ref 0–0.4)
IRON SATN MFR SERPL: 30 % (ref 14–50)
IRON SERPL-MCNC: 97 ΜG/DL (ref 65–175)
LDLC SERPL CALC-MCNC: 90 MG/DL
LDLC/HDLC SERPL: 3.5 {RATIO}
LYMPHOCYTES # BLD AUTO: 2.25 K/UL (ref 1–4.8)
LYMPHOCYTES NFR BLD AUTO: 31.4 % (ref 27–41)
MCH RBC QN AUTO: 29.4 PG (ref 27–31)
MCHC RBC AUTO-ENTMCNC: 33.9 G/DL (ref 32–36)
MCV RBC AUTO: 86.7 FL (ref 80–96)
MONOCYTES # BLD AUTO: 0.62 K/UL (ref 0–0.8)
MONOCYTES NFR BLD AUTO: 8.6 % (ref 2–6)
MPC BLD CALC-MCNC: 10.5 FL (ref 9.4–12.4)
NEUTROPHILS # BLD AUTO: 4.14 K/UL (ref 1.8–7.7)
NEUTROPHILS NFR BLD AUTO: 57.8 % (ref 53–65)
NONHDLC SERPL-MCNC: 128 MG/DL
NRBC # BLD AUTO: 0 X10E3/UL
NRBC, AUTO (.00): 0 %
PLATELET # BLD AUTO: 247 K/UL (ref 150–400)
POTASSIUM SERPL-SCNC: 4.4 MMOL/L (ref 3.5–5.1)
PROT SERPL-MCNC: 8 G/DL (ref 6.4–8.2)
RBC # BLD AUTO: 5.65 M/UL (ref 4.6–6.2)
SODIUM SERPL-SCNC: 138 MMOL/L (ref 136–145)
TIBC SERPL-MCNC: 328 ΜG/DL (ref 250–450)
TRIGL SERPL-MCNC: 190 MG/DL (ref 35–150)
TSH SERPL DL<=0.005 MIU/L-ACNC: 2.71 UIU/ML (ref 0.36–3.74)
VIT B12 SERPL-MCNC: 339 PG/ML (ref 193–986)
VLDLC SERPL-MCNC: 38 MG/DL
WBC # BLD AUTO: 7.17 K/UL (ref 4.5–11)

## 2023-11-20 PROCEDURE — 99214 OFFICE O/P EST MOD 30 MIN: CPT | Mod: ,,, | Performed by: NURSE PRACTITIONER

## 2023-11-20 PROCEDURE — 1159F PR MEDICATION LIST DOCUMENTED IN MEDICAL RECORD: ICD-10-PCS | Mod: CPTII,,, | Performed by: NURSE PRACTITIONER

## 2023-11-20 PROCEDURE — 99214 PR OFFICE/OUTPT VISIT, EST, LEVL IV, 30-39 MIN: ICD-10-PCS | Mod: ,,, | Performed by: NURSE PRACTITIONER

## 2023-11-20 PROCEDURE — 82728 ASSAY OF FERRITIN: CPT | Mod: ,,, | Performed by: CLINICAL MEDICAL LABORATORY

## 2023-11-20 PROCEDURE — 1159F MED LIST DOCD IN RCRD: CPT | Mod: CPTII,,, | Performed by: NURSE PRACTITIONER

## 2023-11-20 PROCEDURE — 82728 FERRITIN: ICD-10-PCS | Mod: ,,, | Performed by: CLINICAL MEDICAL LABORATORY

## 2023-11-20 PROCEDURE — 1160F RVW MEDS BY RX/DR IN RCRD: CPT | Mod: CPTII,,, | Performed by: NURSE PRACTITIONER

## 2023-11-20 PROCEDURE — 80050 GENERAL HEALTH PANEL: CPT | Mod: ,,, | Performed by: CLINICAL MEDICAL LABORATORY

## 2023-11-20 PROCEDURE — 82607 VITAMIN B12: ICD-10-PCS | Mod: ,,, | Performed by: CLINICAL MEDICAL LABORATORY

## 2023-11-20 PROCEDURE — 3079F DIAST BP 80-89 MM HG: CPT | Mod: CPTII,,, | Performed by: NURSE PRACTITIONER

## 2023-11-20 PROCEDURE — 1160F PR REVIEW ALL MEDS BY PRESCRIBER/CLIN PHARMACIST DOCUMENTED: ICD-10-PCS | Mod: CPTII,,, | Performed by: NURSE PRACTITIONER

## 2023-11-20 PROCEDURE — 80061 LIPID PANEL: ICD-10-PCS | Mod: ,,, | Performed by: CLINICAL MEDICAL LABORATORY

## 2023-11-20 PROCEDURE — 83036 HEMOGLOBIN GLYCOSYLATED A1C: CPT | Mod: ,,, | Performed by: CLINICAL MEDICAL LABORATORY

## 2023-11-20 PROCEDURE — 82607 VITAMIN B-12: CPT | Mod: ,,, | Performed by: CLINICAL MEDICAL LABORATORY

## 2023-11-20 PROCEDURE — 83550 IRON AND TIBC: ICD-10-PCS | Mod: ,,, | Performed by: CLINICAL MEDICAL LABORATORY

## 2023-11-20 PROCEDURE — 3008F BODY MASS INDEX DOCD: CPT | Mod: CPTII,,, | Performed by: NURSE PRACTITIONER

## 2023-11-20 PROCEDURE — 83540 ASSAY OF IRON: CPT | Mod: ,,, | Performed by: CLINICAL MEDICAL LABORATORY

## 2023-11-20 PROCEDURE — 3008F PR BODY MASS INDEX (BMI) DOCUMENTED: ICD-10-PCS | Mod: CPTII,,, | Performed by: NURSE PRACTITIONER

## 2023-11-20 PROCEDURE — 80061 LIPID PANEL: CPT | Mod: ,,, | Performed by: CLINICAL MEDICAL LABORATORY

## 2023-11-20 PROCEDURE — 3079F PR MOST RECENT DIASTOLIC BLOOD PRESSURE 80-89 MM HG: ICD-10-PCS | Mod: CPTII,,, | Performed by: NURSE PRACTITIONER

## 2023-11-20 PROCEDURE — 83036 HEMOGLOBIN A1C: ICD-10-PCS | Mod: ,,, | Performed by: CLINICAL MEDICAL LABORATORY

## 2023-11-20 PROCEDURE — 3075F PR MOST RECENT SYSTOLIC BLOOD PRESS GE 130-139MM HG: ICD-10-PCS | Mod: CPTII,,, | Performed by: NURSE PRACTITIONER

## 2023-11-20 PROCEDURE — 80050 PR  GENERAL HEALTH PANEL: ICD-10-PCS | Mod: ,,, | Performed by: CLINICAL MEDICAL LABORATORY

## 2023-11-20 PROCEDURE — 83540 IRON AND TIBC: ICD-10-PCS | Mod: ,,, | Performed by: CLINICAL MEDICAL LABORATORY

## 2023-11-20 PROCEDURE — 3075F SYST BP GE 130 - 139MM HG: CPT | Mod: CPTII,,, | Performed by: NURSE PRACTITIONER

## 2023-11-20 PROCEDURE — 83550 IRON BINDING TEST: CPT | Mod: ,,, | Performed by: CLINICAL MEDICAL LABORATORY

## 2023-11-20 RX ORDER — ESCITALOPRAM OXALATE 20 MG/1
20 TABLET ORAL DAILY
Qty: 90 TABLET | Refills: 3 | Status: SHIPPED | OUTPATIENT
Start: 2023-11-20

## 2023-11-20 RX ORDER — PRAMIPEXOLE DIHYDROCHLORIDE 0.12 MG/1
0.25 TABLET ORAL NIGHTLY
Qty: 60 TABLET | Refills: 1 | Status: SHIPPED | OUTPATIENT
Start: 2023-11-20 | End: 2024-01-08

## 2023-11-20 RX ORDER — PANTOPRAZOLE SODIUM 40 MG/1
40 TABLET, DELAYED RELEASE ORAL DAILY
Qty: 90 TABLET | Refills: 3 | Status: SHIPPED | OUTPATIENT
Start: 2023-11-20

## 2023-11-20 NOTE — PROGRESS NOTES
[facility]       PATIENT NAME: Gerald Montague   : 1980    AGE: 43 y.o. DATE OF ENCOUNTER: 23    MRN: [id number]      PCP: Antonietta Marte FNP    Reason for Visit / Chief Complaint:  restless leg (Patient presents to clinic with complaints of restless leg at night times several months and refills on medications)         274}    Subjective:     HPI:    Presents as a walk-in c/o restless legs for awhile.  Reports he stands on concrete all day and in the evenings & at night his legs feel very restless and move a lot.  Also needs med refills for anxiety and GERD well controlled with current treatment.    No showed for appts 10/10/23, 23, 23, 3/27/23, and canceled in Dec 2022 & 2023.    Review of Systems:   Review of Systems   Constitutional: Negative.    HENT: Negative.     Eyes: Negative.    Respiratory: Negative.     Cardiovascular: Negative.    Gastrointestinal: Negative.    Endocrine: Negative.    Genitourinary: Negative.    Musculoskeletal: Negative.    Skin: Negative.    Allergic/Immunologic: Negative.    Neurological: Negative.         Restless legs   Hematological: Negative.    Psychiatric/Behavioral: Negative.         Allergies and Meds: 274}   Review of patient's allergies indicates:  No Known Allergies     Current Outpatient Medications:     cetirizine (ZYRTEC) 10 MG tablet, Take 1 tablet (10 mg total) by mouth once daily., Disp: 90 tablet, Rfl: 3    EScitalopram oxalate (LEXAPRO) 20 MG tablet, Take 1 tablet (20 mg total) by mouth once daily. Must have appt., Disp: 90 tablet, Rfl: 3    pantoprazole (PROTONIX) 40 MG tablet, Take 1 tablet (40 mg total) by mouth once daily., Disp: 90 tablet, Rfl: 3    pramipexole (MIRAPEX) 0.125 MG tablet, Take 2 tablets (0.25 mg total) by mouth every evening. Start with 1 tablet and can increase to 2 tabs in 1 wk if needed to control restless legs., Disp: 60 tablet, Rfl: 1    Labs:274}   I have reviewed labs  "below:  Lab Results   Component Value Date    CHOL 148 03/24/2022    TRIG 129 03/24/2022    HDL 34 (L) 03/24/2022    LDLCALC 88 03/24/2022       Medical History: 274}     Past Medical History:   Diagnosis Date    Anxiety 03/16/2018    Cervical spondylosis 06/10/2020    noted on CT neck    Dysphagia     GERD (gastroesophageal reflux disease) 02/26/2018    Globus sensation     History of COVID-19 12/17/2020    Other long term (current) drug therapy     Smokeless tobacco use       Social History     Tobacco Use   Smoking Status Never   Smokeless Tobacco Current      History reviewed. No pertinent surgical history.     Health Maintenance: 274}     Health Maintenance         Date Due Completion Date    COVID-19 Vaccine (1) Never done ---    Hemoglobin A1c (Diabetic Prevention Screening) Never done ---    HIV Screening 12/06/2027 (Originally 1/21/1995) ---    Lipid Panel 03/24/2027 3/24/2022    TETANUS VACCINE 03/24/2032 3/24/2022          Immunization History   Administered Date(s) Administered    Influenza - Quadrivalent - PF *Preferred* (6 months and older) 10/12/2022    Tdap 03/24/2022     Objective:  274}   /82 (BP Location: Left arm, Patient Position: Sitting, BP Method: Large (Automatic))   Pulse 60   Temp 97.9 °F (36.6 °C) (Oral)   Resp 20   Ht 6' 2" (1.88 m)   Wt 107.8 kg (237 lb 9.6 oz)   SpO2 99%   BMI 30.51 kg/m²     Wt Readings from Last 3 Encounters:   11/20/23 107.8 kg (237 lb 9.6 oz)   10/12/22 120.2 kg (265 lb)   03/24/22 114.9 kg (253 lb 6.4 oz)     BP Readings from Last 3 Encounters:   11/20/23 138/82   10/12/22 124/86   03/24/22 116/64     Body mass index is 30.51 kg/m².     Physical Exam  Vitals and nursing note reviewed.   Constitutional:       General: He is not in acute distress.     Appearance: Normal appearance.   HENT:      Head: Normocephalic.      Mouth/Throat:      Pharynx: Uvula midline. No posterior oropharyngeal erythema or uvula swelling.   Eyes:      Conjunctiva/sclera: " Conjunctivae normal.      Pupils: Pupils are equal, round, and reactive to light.   Neck:      Thyroid: No thyromegaly.      Vascular: Normal carotid pulses. No carotid bruit.   Cardiovascular:      Rate and Rhythm: Normal rate and regular rhythm.      Pulses: Normal pulses.      Heart sounds: Normal heart sounds.   Pulmonary:      Effort: Pulmonary effort is normal. No respiratory distress.      Breath sounds: Normal breath sounds.   Abdominal:      Palpations: Abdomen is soft.      Tenderness: There is no abdominal tenderness.   Musculoskeletal:      Cervical back: Neck supple.      Right lower leg: No edema.      Left lower leg: No edema.   Lymphadenopathy:      Cervical: No cervical adenopathy.   Skin:     General: Skin is warm and dry.   Neurological:      General: No focal deficit present.      Mental Status: He is alert and oriented to person, place, and time.   Psychiatric:         Mood and Affect: Mood normal.         Behavior: Behavior normal.          Assessment and Plan: 274}     1. Restless legs  Comments:  Ongoing for awhile and not controlled, start Mirapex and titrate up to control  Orders:  -     CBC Auto Differential; Future; Expected date: 11/20/2023  -     Comprehensive Metabolic Panel; Future; Expected date: 11/20/2023  -     TSH; Future; Expected date: 11/20/2023  -     Hemoglobin A1C; Future; Expected date: 11/20/2023  -     Vitamin B12; Future; Expected date: 11/20/2023  -     Iron and TIBC; Future; Expected date: 11/20/2023  -     Ferritin; Future; Expected date: 11/20/2023  -     pramipexole (MIRAPEX) 0.125 MG tablet; Take 2 tablets (0.25 mg total) by mouth every evening. Start with 1 tablet and can increase to 2 tabs in 1 wk if needed to control restless legs.  Dispense: 60 tablet; Refill: 1    2. Anxiety  Comments:  Controlled, continue Lexapro.  Orders:  -     EScitalopram oxalate (LEXAPRO) 20 MG tablet; Take 1 tablet (20 mg total) by mouth once daily. Must have appt.  Dispense: 90  tablet; Refill: 3    3. Gastroesophageal reflux disease without esophagitis  Comments:  Controlled, continue Protonix.  Orders:  -     pantoprazole (PROTONIX) 40 MG tablet; Take 1 tablet (40 mg total) by mouth once daily.  Dispense: 90 tablet; Refill: 3    4. Diabetes mellitus screening  -     Hemoglobin A1C; Future; Expected date: 11/20/2023    5. Encounter for long-term (current) use of other medications  -     CBC Auto Differential; Future; Expected date: 11/20/2023  -     Comprehensive Metabolic Panel; Future; Expected date: 11/20/2023  -     Lipid Panel; Future; Expected date: 11/20/2023  -     TSH; Future; Expected date: 11/20/2023  -     Hemoglobin A1C; Future; Expected date: 11/20/2023  -     Vitamin B12; Future; Expected date: 11/20/2023    6. Screening for hyperlipidemia  -     Lipid Panel; Future; Expected date: 11/20/2023       Declines flu shot, gets it at work.  Lab work-up for restless legs and other labs needed for monitoring.  Start mirapex for restless legs and titrate up to control.    Return to clinic if RLS not improving; and sooner as needed.     Signature:  SUMAN Pearce

## 2023-11-20 NOTE — LETTER
November 20, 2023    Gerald Montague  [address]  [place] [address]             [facility]  Family Medicine  [address] [place] [address]  Phone: [phone]  Fax: [phone]   November 20, 2023     Patient: Gerald Montague   YOB: 1980   Date of Visit: 11/20/2023       To Whom it May Concern:    Gerald Montague was seen in my clinic on 11/20/2023. He may return to work on 11/20/2023 .    Please excuse him from any classes or work missed.    If you have any questions or concerns, please don't hesitate to call.    Sincerely,         Antonietta Marte FNP

## 2023-11-26 PROBLEM — E78.1 ABNORMALLY LOW HIGH DENSITY LIPOPROTEIN (HDL) CHOLESTEROL WITH HYPERTRIGLYCERIDEMIA: Status: ACTIVE | Noted: 2023-11-26

## 2023-11-26 PROBLEM — E78.6 ABNORMALLY LOW HIGH DENSITY LIPOPROTEIN (HDL) CHOLESTEROL WITH HYPERTRIGLYCERIDEMIA: Status: ACTIVE | Noted: 2023-11-26

## 2023-11-26 PROBLEM — R73.03 PREDIABETES: Status: ACTIVE | Noted: 2023-11-26

## 2023-11-26 NOTE — PROGRESS NOTES
Call pt and review results. Trigs too high & HDL too low, needs to start fenofibrate 160 mg daily to prevent stroke or MI with 3 mth f/u HLD.  Very mild glucose elevation w/ predm A1c of 5.8%. Decrease sugars & white foods, lose weight; high risk to progress to T2DM w/o changes. Normal iron & B12.

## 2023-12-13 DIAGNOSIS — E78.1 ABNORMALLY LOW HIGH DENSITY LIPOPROTEIN (HDL) CHOLESTEROL WITH HYPERTRIGLYCERIDEMIA: Primary | ICD-10-CM

## 2023-12-13 DIAGNOSIS — E78.6 ABNORMALLY LOW HIGH DENSITY LIPOPROTEIN (HDL) CHOLESTEROL WITH HYPERTRIGLYCERIDEMIA: Primary | ICD-10-CM

## 2023-12-13 RX ORDER — FENOFIBRATE 160 MG/1
160 TABLET ORAL DAILY
Qty: 90 TABLET | Refills: 3 | Status: SHIPPED | OUTPATIENT
Start: 2023-12-13 | End: 2024-12-12

## 2023-12-13 NOTE — PROGRESS NOTES
Rx for fenofibrate has been sent to his pharmacy.  He needs to be scheduled for 3 mth f/u HLD w/ fasting labs.  Message sent to Ivone to call patient.

## 2024-01-08 DIAGNOSIS — G25.81 RESTLESS LEGS: ICD-10-CM

## 2024-01-08 RX ORDER — PRAMIPEXOLE DIHYDROCHLORIDE 0.12 MG/1
0.25 TABLET ORAL NIGHTLY
Qty: 180 TABLET | Refills: 1 | Status: SHIPPED | OUTPATIENT
Start: 2024-01-08

## 2024-01-08 NOTE — TELEPHONE ENCOUNTER
I got a request to refill Mirapex which is a new medication for him.  Please check with patient and see if he is taking 1 tablet or 2 so I know how to adjust his quantity for the refill.

## 2024-01-20 DIAGNOSIS — J31.0 CHRONIC RHINITIS: ICD-10-CM

## 2024-01-21 RX ORDER — CETIRIZINE HYDROCHLORIDE 10 MG/1
10 TABLET ORAL DAILY
Qty: 90 TABLET | Refills: 3 | Status: SHIPPED | OUTPATIENT
Start: 2024-01-21

## 2024-07-24 ENCOUNTER — OFFICE VISIT (OUTPATIENT)
Dept: FAMILY MEDICINE | Facility: CLINIC | Age: 44
End: 2024-07-24
Payer: COMMERCIAL

## 2024-07-24 VITALS
WEIGHT: 247.63 LBS | HEIGHT: 76 IN | DIASTOLIC BLOOD PRESSURE: 85 MMHG | TEMPERATURE: 98 F | RESPIRATION RATE: 18 BRPM | SYSTOLIC BLOOD PRESSURE: 132 MMHG | HEART RATE: 58 BPM | BODY MASS INDEX: 30.15 KG/M2 | OXYGEN SATURATION: 100 %

## 2024-07-24 DIAGNOSIS — G25.81 RESTLESS LEGS: Primary | Chronic | ICD-10-CM

## 2024-07-24 PROCEDURE — 1159F MED LIST DOCD IN RCRD: CPT | Mod: CPTII,,, | Performed by: NURSE PRACTITIONER

## 2024-07-24 PROCEDURE — 99213 OFFICE O/P EST LOW 20 MIN: CPT | Mod: ,,, | Performed by: NURSE PRACTITIONER

## 2024-07-24 PROCEDURE — 3008F BODY MASS INDEX DOCD: CPT | Mod: CPTII,,, | Performed by: NURSE PRACTITIONER

## 2024-07-24 PROCEDURE — 3079F DIAST BP 80-89 MM HG: CPT | Mod: CPTII,,, | Performed by: NURSE PRACTITIONER

## 2024-07-24 PROCEDURE — 1160F RVW MEDS BY RX/DR IN RCRD: CPT | Mod: CPTII,,, | Performed by: NURSE PRACTITIONER

## 2024-07-24 PROCEDURE — 3075F SYST BP GE 130 - 139MM HG: CPT | Mod: CPTII,,, | Performed by: NURSE PRACTITIONER

## 2024-07-24 RX ORDER — PRAMIPEXOLE DIHYDROCHLORIDE 0.5 MG/1
0.5 TABLET ORAL NIGHTLY
Qty: 90 TABLET | Refills: 1 | Status: SHIPPED | OUTPATIENT
Start: 2024-07-24

## 2024-07-24 NOTE — ASSESSMENT & PLAN NOTE
Not controlled but improved with Mirapex 0.375 mg, increase dose on his own about one-mth ago.  Increase Mirapex to 0.5 mg nightly.  If RLS not controlled with dosage increase, we will have to consider alternative treatment.

## 2024-07-24 NOTE — PROGRESS NOTES
[facility] - FAMILY MEDICINE       PATIENT NAME: Gerald Montague   : 1980    AGE: 44 y.o. DATE OF ENCOUNTER: 24    MRN: [id number]      PCP: Antonietta Marte FNP    Subjective:     Reason for Visit / Chief Complaint:     274}  Chief Complaint   Patient presents with    Restless Legs     Patient c/o increase in his restless legs and requests an increase in Mirapex.    Health Maintenance     Care gaps addressed, patient declines Covid vaccine and is otherwise up to date.    [facility]     Presents as a walk-in requesting med refill for restless legs.  Missed past appointments due to no-shows and cancellations    Review of Systems:     Review of Systems   Constitutional: Negative.    HENT: Negative.     Eyes: Negative.    Respiratory: Negative.     Cardiovascular: Negative.    Gastrointestinal: Negative.    Endocrine: Negative.    Genitourinary: Negative.    Musculoskeletal: Negative.    Skin: Negative.    Allergic/Immunologic: Negative.    Neurological: Negative.         Restless legs   Hematological: Negative.    Psychiatric/Behavioral: Negative.         Allergies and Meds: 274}     Review of patient's allergies indicates:  No Known Allergies     Current Outpatient Medications   Medication Sig Dispense Refill    cetirizine (ALLERGY RELIEF, CETIRIZINE,) 10 MG tablet Take 1 tablet (10 mg total) by mouth once daily. 90 tablet 3    EScitalopram oxalate (LEXAPRO) 20 MG tablet Take 1 tablet (20 mg total) by mouth once daily. Must have appt. 90 tablet 3    fenofibrate 160 MG Tab Take 1 tablet (160 mg total) by mouth once daily. 90 tablet 3    pantoprazole (PROTONIX) 40 MG tablet Take 1 tablet (40 mg total) by mouth once daily. 90 tablet 3    pramipexole (MIRAPEX) 0.5 MG tablet Take 1 tablet (0.5 mg total) by mouth every evening. 90 tablet 1     No current facility-administered medications for this visit.       Labs:274}   I have reviewed labs below:    Lab Results   Component  "Value Date    WBC 7.17 11/20/2023    RBC 5.65 11/20/2023    HGB 16.6 11/20/2023    HCT 49.0 11/20/2023     11/20/2023     11/20/2023    K 4.4 11/20/2023     11/20/2023    CALCIUM 9.2 11/20/2023     (H) 11/20/2023    BUN 12 11/20/2023    CREATININE 0.97 11/20/2023    ALT 35 11/20/2023    AST 22 11/20/2023    CHOL 154 11/20/2023    TRIG 190 (H) 11/20/2023    HDL 26 (L) 11/20/2023    LDLCALC 90 11/20/2023    TSH 2.710 11/20/2023    HGBA1C 5.8 11/20/2023     Lab Results   Component Value Date    FERRITIN 313 11/20/2023     Medical History: 274}     Past Medical History:   Diagnosis Date    Anxiety 03/16/2018    Cervical spondylosis 06/10/2020    noted on CT neck    Dysphagia     GERD (gastroesophageal reflux disease) 02/26/2018    Globus sensation     History of alcohol abuse     History of COVID-19 12/17/2020    Other long term (current) drug therapy     Smokeless tobacco use       Social History     Tobacco Use   Smoking Status Never   Smokeless Tobacco Current      History reviewed. No pertinent surgical history.     Health Maintenance:      Health Maintenance Topics with due status: Not Due       Topic Last Completion Date    TETANUS VACCINE 03/24/2022    Hemoglobin A1c (Prediabetes) 11/20/2023    Lipid Panel 11/20/2023       Objective:  274}   Vital Signs  Temp: 98 °F (36.7 °C)  Temp Source: Oral  Pulse: (!) 58  Resp: 18  SpO2: 100 %  BP: 132/85  BP Location: Left arm  Patient Position: Sitting  Pain Score: 0-No pain  Height and Weight  Height: 6' 4" (193 cm)  Weight: 112.3 kg (247 lb 9.6 oz)  BSA (Calculated - sq m): 2.45 sq meters  BMI (Calculated): 30.2  Weight in (lb) to have BMI = 25: 205    Over the last two weeks how often have you been bothered by little interest or pleasure in doing things: 0  Over the last two weeks how often have you been bothered by feeling down, depressed or hopeless: 0  PHQ-2 Total Score: 0  PHQ-9 Score: 0  PHQ-9 Interpretation: Minimal or None    Wt Readings " from Last 3 Encounters:   07/24/24 112.3 kg (247 lb 9.6 oz)   11/20/23 107.8 kg (237 lb 9.6 oz)   10/12/22 120.2 kg (265 lb)     Physical Exam  Vitals and nursing note reviewed.   Constitutional:       General: He is not in acute distress.     Appearance: Normal appearance. He is not ill-appearing.   HENT:      Head: Normocephalic.   Eyes:      Conjunctiva/sclera: Conjunctivae normal.   Cardiovascular:      Rate and Rhythm: Normal rate and regular rhythm.      Heart sounds: Normal heart sounds.   Pulmonary:      Effort: Pulmonary effort is normal. No respiratory distress.      Breath sounds: Normal breath sounds.   Musculoskeletal:      Right lower leg: No edema.      Left lower leg: No edema.   Skin:     General: Skin is warm and dry.      Coloration: Skin is not jaundiced or pale.   Neurological:      Mental Status: He is alert and oriented to person, place, and time.      Gait: Gait normal.   Psychiatric:         Mood and Affect: Mood normal.         Behavior: Behavior normal.         Thought Content: Thought content normal.         Judgment: Judgment normal.          Assessment and Plan: 274}     1. Restless legs  Assessment & Plan:  Not controlled but improved with Mirapex 0.375 mg, increase dose on his own about one-mth ago.  Increase Mirapex to 0.5 mg nightly.  If RLS not controlled with dosage increase, we will have to consider alternative treatment.    Orders:  -     pramipexole (MIRAPEX) 0.5 MG tablet; Take 1 tablet (0.5 mg total) by mouth every evening.  Dispense: 90 tablet; Refill: 1      Diagnosis, risks, benefits, and side effects of any meds and treatment plan were discussed with the patient.  All questions were answered to the satisfaction of the patient, and pt verbalized understanding and agreement to treatment plan.      Follow up in about 17 weeks (around 11/20/2024) for wellness, with fasting labs.    Signature:  AMRITA Pearce    Future Appointments   Date Time Provider Department  [place]   11/20/2024  7:40 AM Antonietta Marte FNP [facility]

## 2024-08-09 ENCOUNTER — LAB VISIT (OUTPATIENT)
Dept: PRIMARY CARE CLINIC | Facility: CLINIC | Age: 44
End: 2024-08-09

## 2024-08-09 DIAGNOSIS — Z02.83 ENCOUNTER FOR DRUG SCREENING: Primary | ICD-10-CM

## 2024-11-20 ENCOUNTER — OFFICE VISIT (OUTPATIENT)
Dept: FAMILY MEDICINE | Facility: CLINIC | Age: 44
End: 2024-11-20
Payer: COMMERCIAL

## 2024-11-20 VITALS
HEIGHT: 76 IN | HEART RATE: 65 BPM | WEIGHT: 248.19 LBS | RESPIRATION RATE: 18 BRPM | SYSTOLIC BLOOD PRESSURE: 117 MMHG | OXYGEN SATURATION: 99 % | DIASTOLIC BLOOD PRESSURE: 80 MMHG | BODY MASS INDEX: 30.22 KG/M2 | TEMPERATURE: 98 F

## 2024-11-20 DIAGNOSIS — Z00.00 ROUTINE GENERAL MEDICAL EXAMINATION AT A HEALTH CARE FACILITY: Primary | ICD-10-CM

## 2024-11-20 DIAGNOSIS — E78.1 ABNORMALLY LOW HIGH DENSITY LIPOPROTEIN (HDL) CHOLESTEROL WITH HYPERTRIGLYCERIDEMIA: ICD-10-CM

## 2024-11-20 DIAGNOSIS — Z13.220 SCREENING FOR HYPERLIPIDEMIA: ICD-10-CM

## 2024-11-20 DIAGNOSIS — Z13.1 DIABETES MELLITUS SCREENING: ICD-10-CM

## 2024-11-20 DIAGNOSIS — Z79.899 OTHER LONG TERM (CURRENT) DRUG THERAPY: ICD-10-CM

## 2024-11-20 DIAGNOSIS — K21.9 GASTROESOPHAGEAL REFLUX DISEASE WITHOUT ESOPHAGITIS: Chronic | ICD-10-CM

## 2024-11-20 DIAGNOSIS — F41.9 ANXIETY: Chronic | ICD-10-CM

## 2024-11-20 DIAGNOSIS — R73.03 PREDIABETES: ICD-10-CM

## 2024-11-20 DIAGNOSIS — E78.6 ABNORMALLY LOW HIGH DENSITY LIPOPROTEIN (HDL) CHOLESTEROL WITH HYPERTRIGLYCERIDEMIA: ICD-10-CM

## 2024-11-20 DIAGNOSIS — G25.81 RESTLESS LEGS: Chronic | ICD-10-CM

## 2024-11-20 LAB
ALBUMIN SERPL BCP-MCNC: 4.2 G/DL (ref 3.5–5)
ALBUMIN/GLOB SERPL: 1.2 {RATIO}
ALP SERPL-CCNC: 68 U/L (ref 40–150)
ALT SERPL W P-5'-P-CCNC: 23 U/L
ANION GAP SERPL CALCULATED.3IONS-SCNC: 12 MMOL/L (ref 7–16)
AST SERPL W P-5'-P-CCNC: 23 U/L (ref 5–34)
BILIRUB SERPL-MCNC: 0.3 MG/DL
BUN SERPL-MCNC: 19 MG/DL (ref 9–21)
BUN/CREAT SERPL: 22 (ref 6–20)
CALCIUM SERPL-MCNC: 9 MG/DL (ref 8.4–10.2)
CHLORIDE SERPL-SCNC: 105 MMOL/L (ref 98–107)
CHOLEST SERPL-MCNC: 120 MG/DL
CHOLEST/HDLC SERPL: 6.7 {RATIO}
CO2 SERPL-SCNC: 22 MMOL/L (ref 22–29)
CREAT SERPL-MCNC: 0.86 MG/DL (ref 0.72–1.25)
EGFR (NO RACE VARIABLE) (RUSH/TITUS): 109 ML/MIN/1.73M2
EST. AVERAGE GLUCOSE BLD GHB EST-MCNC: 120 MG/DL
GLOBULIN SER-MCNC: 3.5 G/DL (ref 2–4)
GLUCOSE SERPL-MCNC: 144 MG/DL (ref 74–100)
HBA1C MFR BLD HPLC: 5.8 %
HDLC SERPL-MCNC: 18 MG/DL (ref 35–60)
NONHDLC SERPL-MCNC: 102 MG/DL
POTASSIUM SERPL-SCNC: 4 MMOL/L (ref 3.5–5.1)
PROT SERPL-MCNC: 7.7 G/DL (ref 6.4–8.3)
SODIUM SERPL-SCNC: 135 MMOL/L (ref 136–145)
TRIGL SERPL-MCNC: 466 MG/DL (ref 34–140)
VLDLC SERPL-MCNC: ABNORMAL MG/DL

## 2024-11-20 PROCEDURE — 80053 COMPREHEN METABOLIC PANEL: CPT | Mod: ,,, | Performed by: CLINICAL MEDICAL LABORATORY

## 2024-11-20 PROCEDURE — 80061 LIPID PANEL: CPT | Mod: ,,, | Performed by: CLINICAL MEDICAL LABORATORY

## 2024-11-20 PROCEDURE — 99396 PREV VISIT EST AGE 40-64: CPT | Mod: ,,, | Performed by: NURSE PRACTITIONER

## 2024-11-20 PROCEDURE — 3074F SYST BP LT 130 MM HG: CPT | Mod: ,,, | Performed by: NURSE PRACTITIONER

## 2024-11-20 PROCEDURE — 3079F DIAST BP 80-89 MM HG: CPT | Mod: ,,, | Performed by: NURSE PRACTITIONER

## 2024-11-20 PROCEDURE — 1159F MED LIST DOCD IN RCRD: CPT | Mod: ,,, | Performed by: NURSE PRACTITIONER

## 2024-11-20 PROCEDURE — 83036 HEMOGLOBIN GLYCOSYLATED A1C: CPT | Mod: ,,, | Performed by: CLINICAL MEDICAL LABORATORY

## 2024-11-20 PROCEDURE — 1160F RVW MEDS BY RX/DR IN RCRD: CPT | Mod: ,,, | Performed by: NURSE PRACTITIONER

## 2024-11-20 PROCEDURE — 3008F BODY MASS INDEX DOCD: CPT | Mod: ,,, | Performed by: NURSE PRACTITIONER

## 2024-11-20 RX ORDER — PRAMIPEXOLE DIHYDROCHLORIDE 0.25 MG/1
0.75 TABLET ORAL NIGHTLY
Qty: 90 TABLET | Refills: 1 | Status: SHIPPED | OUTPATIENT
Start: 2024-11-20

## 2024-11-20 RX ORDER — ESCITALOPRAM OXALATE 20 MG/1
20 TABLET ORAL DAILY
Qty: 90 TABLET | Refills: 3 | Status: SHIPPED | OUTPATIENT
Start: 2024-11-20

## 2024-11-20 RX ORDER — PANTOPRAZOLE SODIUM 40 MG/1
40 TABLET, DELAYED RELEASE ORAL DAILY
Qty: 90 TABLET | Refills: 3 | Status: SHIPPED | OUTPATIENT
Start: 2024-11-20

## 2024-11-20 NOTE — LETTER
November 20, 2024      [facility]  [address] [place] [address]  Phone: [phone]  Fax: [phone]       Patient: Gerald Montague   YOB: 1980  Date of Visit: 11/20/2024    To Whom It May Concern:    Ninoska Montague  was at [facility] on 11/20/2024. The patient may return to work on 11/20/24 with no restrictions. If you have any questions or concerns, or if I can be of further assistance, please do not hesitate to contact me.    Sincerely,    SUMAN Pearce

## 2024-11-20 NOTE — PROGRESS NOTES
[facility]  [address] [place] [address]  Phone: [phone]  Fax: [phone]     PATIENT NAME: Gerald Montague   : 1980    AGE: 44 y.o. DATE OF ENCOUNTER: 24    Provider:    MRN: [id number]      Subjective     Patient ID: Gerald Montague is a 44 y.o. male.    Chief Complaint: Healthy You (Patient reports to the clinic for [employer] Healthy You, he had coffee with cream and sugar this morning.) and Health Maintenance (Care gaps up to date, with the exception of the Covid vaccine.//Sri Shaikh CMA)    HPI  Healthy You  F/u anxiety, GERD, and HLD with low HDL.  Rx for fenofibrate 2023 but is not taking med, thought it made him feel bad, but in retrospect thinks dipping makes him feel bad but is struggling to quit.     RLS med not working as well    Review of Systems   Constitutional: Negative.    HENT: Negative.     Respiratory: Negative.     Cardiovascular: Negative.    Gastrointestinal: Negative.    Genitourinary: Negative.    Integumentary:  Negative.   Neurological: Negative.         Restless legs   Psychiatric/Behavioral: Negative.         Tobacco Use: High Risk (2024)    Patient History     Smoking Tobacco Use: Never     Smokeless Tobacco Use: Current     Passive Exposure: Not on file     Review of patient's allergies indicates:  No Known Allergies  Current Outpatient Medications   Medication Instructions    cetirizine (ALLERGY RELIEF (CETIRIZINE)) 10 mg, Oral, Daily    EScitalopram oxalate (LEXAPRO) 20 mg, Oral, Daily    pantoprazole (PROTONIX) 40 mg, Oral, Daily    pramipexole (MIRAPEX) 0.75 mg, Oral, Nightly, 2-3 hours before bedtime for RLS     Medications Discontinued During This Encounter   Medication Reason    pantoprazole (PROTONIX) 40 MG tablet Reorder    EScitalopram oxalate (LEXAPRO) 20 MG tablet Reorder    fenofibrate 160 MG Tab Patient no longer taking    pramipexole (MIRAPEX) 0.5 MG tablet Dose adjustment       Past Medical  "History:   Diagnosis Date    Anxiety 03/16/2018    Cervical spondylosis 06/10/2020    noted on CT neck    Dysphagia     GERD (gastroesophageal reflux disease) 02/26/2018    Globus sensation     History of alcohol abuse     History of COVID-19 12/17/2020    Other long term (current) drug therapy     Smokeless tobacco use      Health Maintenance Topics with due status: Not Due       Topic Last Completion Date    TETANUS VACCINE 03/24/2022    Lipid Panel 11/20/2023    RSV Vaccine (Age 60+ and Pregnant patients) Not Due     Immunization History   Administered Date(s) Administered    Influenza - Quadrivalent - PF *Preferred* (6 months and older) 10/12/2022    Tdap 03/24/2022       Objective     Body mass index is 30.21 kg/m².  Wt Readings from Last 3 Encounters:   11/20/24 112.6 kg (248 lb 3.2 oz)   07/24/24 112.3 kg (247 lb 9.6 oz)   11/20/23 107.8 kg (237 lb 9.6 oz)     Ht Readings from Last 3 Encounters:   11/20/24 6' 4" (1.93 m)   07/24/24 6' 4" (1.93 m)   11/20/23 6' 2" (1.88 m)     BP Readings from Last 3 Encounters:   11/20/24 117/80   07/24/24 132/85   11/20/23 138/82     Temp Readings from Last 3 Encounters:   11/20/24 97.7 °F (36.5 °C) (Oral)   07/24/24 98 °F (36.7 °C) (Oral)   11/20/23 97.9 °F (36.6 °C) (Oral)     Pulse Readings from Last 3 Encounters:   11/20/24 65   07/24/24 (!) 58   11/20/23 60     Resp Readings from Last 3 Encounters:   11/20/24 18   07/24/24 18   11/20/23 20     PF Readings from Last 3 Encounters:   No data found for PF       Physical Exam  Vitals and nursing note reviewed.   Constitutional:       General: He is not in acute distress.     Appearance: Normal appearance. He is not ill-appearing.   HENT:      Head: Normocephalic.      Right Ear: Tympanic membrane, ear canal and external ear normal.      Left Ear: Tympanic membrane, ear canal and external ear normal.      Nose: Nose normal.      Mouth/Throat:      Mouth: Mucous membranes are moist.      Pharynx: Oropharynx is clear. Uvula " midline. No posterior oropharyngeal erythema or uvula swelling.   Eyes:      Conjunctiva/sclera: Conjunctivae normal.      Pupils: Pupils are equal, round, and reactive to light.   Neck:      Thyroid: No thyromegaly.      Vascular: Normal carotid pulses. No carotid bruit.   Cardiovascular:      Rate and Rhythm: Normal rate and regular rhythm.      Pulses: Normal pulses.      Heart sounds: Normal heart sounds.   Pulmonary:      Effort: Pulmonary effort is normal. No respiratory distress.      Breath sounds: Normal breath sounds. No wheezing, rhonchi or rales.   Abdominal:      Palpations: Abdomen is soft.      Tenderness: There is no abdominal tenderness.   Musculoskeletal:      Cervical back: Neck supple.      Right lower leg: No edema.      Left lower leg: No edema.   Lymphadenopathy:      Cervical: No cervical adenopathy.   Skin:     General: Skin is warm and dry.      Coloration: Skin is not jaundiced or pale.   Neurological:      General: No focal deficit present.      Mental Status: He is alert and oriented to person, place, and time.      Gait: Gait normal.   Psychiatric:         Mood and Affect: Mood normal.         Behavior: Behavior normal.         Assessment and Plan     1. Routine general medical examination at a health care facility    2. Prediabetes  Overview:  Lab Results   Component Value Date    HGBA1C 5.8 11/20/2023    11/20/23 - NEW diagnosis.    Assessment & Plan:  Lab Results   Component Value Date    HGBA1C 5.8 11/20/2023     Update A1c today.  Weight is up 11 lbs since last year.    Orders:  -     Hemoglobin A1C; Future; Expected date: 11/20/2024  -     Comprehensive Metabolic Panel; Future; Expected date: 11/20/2024    3. Abnormally low high density lipoprotein (HDL) cholesterol with hypertriglyceridemia  Assessment & Plan:  Lab Results   Component Value Date    CHOL 154 11/20/2023    CHOL 148 03/24/2022     Lab Results   Component Value Date    HDL 26 (L) 11/20/2023    HDL 34 (L)  03/24/2022     Lab Results   Component Value Date    LDLCALC 90 11/20/2023    LDLCALC 88 03/24/2022     Lab Results   Component Value Date    TRIG 190 (H) 11/20/2023    TRIG 129 03/24/2022       Lab Results   Component Value Date    CHOLHDL 5.9 11/20/2023    CHOLHDL 4.4 03/24/2022     Recommend starting fenofibrate & rx sent Dec 2023, lifestyle changes, 3 mth f/u HLD (due Feb 2024), didn't f/u for recheck lipids. Not taking fenofibrate.  Recheck lipids today.    Orders:  -     Comprehensive Metabolic Panel; Future; Expected date: 11/20/2024    4. Restless legs  Assessment & Plan:  Not controlled with Mirapex 0.5 mg, taking med around 7:00 p.m. but it is taking longer for onset of action.  Increase Mirapex to 0.75 mg nightly 2-3 hours before bedtime.  Let me know in a proximally 6 weeks how he is doing and if RLS not controlled with dosage increase, we will try Requip.    Orders:  -     pramipexole (MIRAPEX) 0.25 MG tablet; Take 3 tablets (0.75 mg total) by mouth every evening. 2-3 hours before bedtime for RLS  Dispense: 90 tablet; Refill: 1    5. Other long term (current) drug therapy  -     Comprehensive Metabolic Panel; Future; Expected date: 11/20/2024    6. Diabetes mellitus screening  -     Comprehensive Metabolic Panel; Future; Expected date: 11/20/2024    7. Screening for hyperlipidemia  -     Lipid Panel; Future; Expected date: 11/20/2024    8. Gastroesophageal reflux disease without esophagitis  Assessment & Plan:  Controlled  Continue pantoprazole.    Orders:  -     pantoprazole (PROTONIX) 40 MG tablet; Take 1 tablet (40 mg total) by mouth once daily.  Dispense: 90 tablet; Refill: 3    9. Anxiety  Assessment & Plan:  Stable, controlled  Continue Lexapro.    Orders:  -     EScitalopram oxalate (LEXAPRO) 20 MG tablet; Take 1 tablet (20 mg total) by mouth once daily.  Dispense: 90 tablet; Refill: 3         Plan:   Screening lab(s) for Healthy You plus other labs needed for long term monitoring, work-up,  and/or to address care gaps.  Additional treatment plan as above in Assessment and Plan.  Schedule 1 year f/u Healthy You if not already prescheduled plus any additional follow-up as indicated.        I have reviewed the medications, allergies, and problem list.     Goal Actions:    What type of visit is the patient here for today?: Healthy You  Does the patient consent to enroll in [facility] Healthy?: No  Is this a Wellness Follow Up?: No  What is your overall wellness goal? (select at least one): Lifestyle modifications, Lose weight, Improve overall health  Choose 3: Biometric, Lifestyle, Nutrition, Exercise  Biometric Actions: Attend regularly scheduled office visits, BMI>30 lose 1-2 lbs per week, SBP<120 and DBP<80  Lifestyle Actions : Take medications as prescribed  Nurtrition Actions: Eat heart healthy diet, Recommend weight loss, drink 8-10 glasses of water per day  Exercise Actions: Recommend physical activity 30 minutes per day 3-5 times/week      Follow up for Healthy You 1 yr with fasting labs, anxiety, med refill(s), and follow-up as needed.    Signature:  SUMAN Pearce-BC    Future Appointments   Date Time Provider Department Center   12/1/2025  7:20 AM Antonietta Marte FNP [facility] [place]

## 2024-11-20 NOTE — ASSESSMENT & PLAN NOTE
Lab Results   Component Value Date    HGBA1C 5.8 11/20/2023     Update A1c today.  Weight is up 11 lbs since last year.

## 2024-11-20 NOTE — ASSESSMENT & PLAN NOTE
Lab Results   Component Value Date    CHOL 154 11/20/2023    CHOL 148 03/24/2022     Lab Results   Component Value Date    HDL 26 (L) 11/20/2023    HDL 34 (L) 03/24/2022     Lab Results   Component Value Date    LDLCALC 90 11/20/2023    LDLCALC 88 03/24/2022     Lab Results   Component Value Date    TRIG 190 (H) 11/20/2023    TRIG 129 03/24/2022       Lab Results   Component Value Date    CHOLHDL 5.9 11/20/2023    CHOLHDL 4.4 03/24/2022     Recommend starting fenofibrate & rx sent Dec 2023, lifestyle changes, 3 mth f/u HLD (due Feb 2024), didn't f/u for recheck lipids. Not taking fenofibrate.  Recheck lipids today.

## 2024-11-20 NOTE — ASSESSMENT & PLAN NOTE
Not controlled with Mirapex 0.5 mg, taking med around 7:00 p.m. but it is taking longer for onset of action.  Increase Mirapex to 0.75 mg nightly 2-3 hours before bedtime.  Let me know in a proximally 6 weeks how he is doing and if RLS not controlled with dosage increase, we will try Requip.   [id number] Comprehensive

## 2024-11-24 DIAGNOSIS — Z13.220 SCREENING FOR HYPERLIPIDEMIA: Primary | ICD-10-CM

## 2024-11-24 DIAGNOSIS — E78.1 ABNORMALLY LOW HIGH DENSITY LIPOPROTEIN (HDL) CHOLESTEROL WITH HYPERTRIGLYCERIDEMIA: Chronic | ICD-10-CM

## 2024-11-24 DIAGNOSIS — E78.6 ABNORMALLY LOW HIGH DENSITY LIPOPROTEIN (HDL) CHOLESTEROL WITH HYPERTRIGLYCERIDEMIA: Chronic | ICD-10-CM

## 2024-11-24 RX ORDER — FENOFIBRATE 160 MG/1
160 TABLET ORAL DAILY
Qty: 90 TABLET | Refills: 3 | Status: SHIPPED | OUTPATIENT
Start: 2024-11-24 | End: 2025-11-24

## 2024-12-11 DIAGNOSIS — E78.1 ABNORMALLY LOW HIGH DENSITY LIPOPROTEIN (HDL) CHOLESTEROL WITH HYPERTRIGLYCERIDEMIA: Primary | ICD-10-CM

## 2024-12-11 DIAGNOSIS — E78.6 ABNORMALLY LOW HIGH DENSITY LIPOPROTEIN (HDL) CHOLESTEROL WITH HYPERTRIGLYCERIDEMIA: Primary | ICD-10-CM

## 2024-12-11 DIAGNOSIS — Z79.899 OTHER LONG TERM (CURRENT) DRUG THERAPY: ICD-10-CM

## 2025-04-16 ENCOUNTER — OFFICE VISIT (OUTPATIENT)
Dept: FAMILY MEDICINE | Facility: CLINIC | Age: 45
End: 2025-04-16
Payer: COMMERCIAL

## 2025-04-16 VITALS
HEIGHT: 76 IN | SYSTOLIC BLOOD PRESSURE: 106 MMHG | WEIGHT: 238.38 LBS | TEMPERATURE: 99 F | HEART RATE: 61 BPM | OXYGEN SATURATION: 98 % | RESPIRATION RATE: 18 BRPM | DIASTOLIC BLOOD PRESSURE: 70 MMHG | BODY MASS INDEX: 29.03 KG/M2

## 2025-04-16 DIAGNOSIS — K21.9 GASTROESOPHAGEAL REFLUX DISEASE WITHOUT ESOPHAGITIS: Chronic | ICD-10-CM

## 2025-04-16 DIAGNOSIS — R73.03 PREDIABETES: ICD-10-CM

## 2025-04-16 DIAGNOSIS — Z12.12 ENCOUNTER FOR COLORECTAL CANCER SCREENING: ICD-10-CM

## 2025-04-16 DIAGNOSIS — Z12.11 ENCOUNTER FOR COLORECTAL CANCER SCREENING: ICD-10-CM

## 2025-04-16 DIAGNOSIS — G25.81 RESTLESS LEGS: Chronic | ICD-10-CM

## 2025-04-16 DIAGNOSIS — F41.9 ANXIETY: Chronic | ICD-10-CM

## 2025-04-16 DIAGNOSIS — Z79.899 OTHER LONG TERM (CURRENT) DRUG THERAPY: ICD-10-CM

## 2025-04-16 DIAGNOSIS — E78.1 ABNORMALLY LOW HIGH DENSITY LIPOPROTEIN (HDL) CHOLESTEROL WITH HYPERTRIGLYCERIDEMIA: Primary | ICD-10-CM

## 2025-04-16 DIAGNOSIS — E78.6 ABNORMALLY LOW HIGH DENSITY LIPOPROTEIN (HDL) CHOLESTEROL WITH HYPERTRIGLYCERIDEMIA: Primary | ICD-10-CM

## 2025-04-16 LAB
ALBUMIN SERPL BCP-MCNC: 4.5 G/DL (ref 3.5–5)
ALBUMIN/GLOB SERPL: 1.3 {RATIO}
ALP SERPL-CCNC: 49 U/L (ref 40–150)
ALT SERPL W P-5'-P-CCNC: 22 U/L
ANION GAP SERPL CALCULATED.3IONS-SCNC: 10 MMOL/L (ref 7–16)
AST SERPL W P-5'-P-CCNC: 26 U/L (ref 11–45)
BILIRUB SERPL-MCNC: 0.5 MG/DL
BUN SERPL-MCNC: 20 MG/DL (ref 9–21)
BUN/CREAT SERPL: 17 (ref 6–20)
CALCIUM SERPL-MCNC: 9.5 MG/DL (ref 8.4–10.2)
CHLORIDE SERPL-SCNC: 108 MMOL/L (ref 98–107)
CHOLEST SERPL-MCNC: 117 MG/DL
CHOLEST/HDLC SERPL: 3.2 {RATIO}
CO2 SERPL-SCNC: 23 MMOL/L (ref 22–29)
CREAT SERPL-MCNC: 1.16 MG/DL (ref 0.72–1.25)
EGFR (NO RACE VARIABLE) (RUSH/TITUS): 79 ML/MIN/1.73M2
EST. AVERAGE GLUCOSE BLD GHB EST-MCNC: 123 MG/DL
GLOBULIN SER-MCNC: 3.4 G/DL (ref 2–4)
GLUCOSE SERPL-MCNC: 108 MG/DL (ref 74–100)
HBA1C MFR BLD HPLC: 5.9 %
HDLC SERPL-MCNC: 37 MG/DL (ref 35–60)
LDLC SERPL CALC-MCNC: 68 MG/DL
LDLC/HDLC SERPL: 1.8 {RATIO}
NONHDLC SERPL-MCNC: 80 MG/DL
POTASSIUM SERPL-SCNC: 4.1 MMOL/L (ref 3.5–5.1)
PROT SERPL-MCNC: 7.9 G/DL (ref 6.4–8.3)
SODIUM SERPL-SCNC: 137 MMOL/L (ref 136–145)
TRIGL SERPL-MCNC: 58 MG/DL (ref 34–140)
VLDLC SERPL-MCNC: 12 MG/DL

## 2025-04-16 PROCEDURE — 3074F SYST BP LT 130 MM HG: CPT | Mod: CPTII,,, | Performed by: NURSE PRACTITIONER

## 2025-04-16 PROCEDURE — 83036 HEMOGLOBIN GLYCOSYLATED A1C: CPT | Mod: ,,, | Performed by: CLINICAL MEDICAL LABORATORY

## 2025-04-16 PROCEDURE — 80053 COMPREHEN METABOLIC PANEL: CPT | Mod: ,,, | Performed by: CLINICAL MEDICAL LABORATORY

## 2025-04-16 PROCEDURE — 1159F MED LIST DOCD IN RCRD: CPT | Mod: CPTII,,, | Performed by: NURSE PRACTITIONER

## 2025-04-16 PROCEDURE — 3078F DIAST BP <80 MM HG: CPT | Mod: CPTII,,, | Performed by: NURSE PRACTITIONER

## 2025-04-16 PROCEDURE — 99214 OFFICE O/P EST MOD 30 MIN: CPT | Mod: ,,, | Performed by: NURSE PRACTITIONER

## 2025-04-16 PROCEDURE — 3008F BODY MASS INDEX DOCD: CPT | Mod: CPTII,,, | Performed by: NURSE PRACTITIONER

## 2025-04-16 PROCEDURE — 80061 LIPID PANEL: CPT | Mod: ,,, | Performed by: CLINICAL MEDICAL LABORATORY

## 2025-04-16 PROCEDURE — 1160F RVW MEDS BY RX/DR IN RCRD: CPT | Mod: CPTII,,, | Performed by: NURSE PRACTITIONER

## 2025-04-16 NOTE — ASSESSMENT & PLAN NOTE
Lab Results   Component Value Date    CHOL 120 11/20/2024    CHOL 154 11/20/2023    CHOL 148 03/24/2022     Lab Results   Component Value Date    HDL 18 (L) 11/20/2024    HDL 26 (L) 11/20/2023    HDL 34 (L) 03/24/2022     Lab Results   Component Value Date    LDLCALC 90 11/20/2023    LDLCALC 88 03/24/2022     Lab Results   Component Value Date    TRIG 466 (H) 11/20/2024    TRIG 190 (H) 11/20/2023    TRIG 129 03/24/2022       Lab Results   Component Value Date    CHOLHDL 6.7 11/20/2024    CHOLHDL 5.9 11/20/2023    CHOLHDL 4.4 03/24/2022     Recommend starting fenofibrate & rx sent Dec 2023, lifestyle changes, 3 mth f/u HLD (due Feb 2024), didn't f/u for recheck lipids. Never started fenofibrate.    Nov 2024 with lower HDL and increasing trigs, strongly recommended fenofibrate which he is now taking daily and he is exercising with a 10 lb weight loss noted.    Recheck lipids today but he is not fasting.

## 2025-04-16 NOTE — PROGRESS NOTES
[facility]  [address] [place] [address]  Phone: [phone]  Fax: [phone]       PATIENT NAME: Gerald Montague   : 1980    AGE: 45 y.o. DATE OF ENCOUNTER: 25    MRN: [id number]      PCP: Antonietta Marte FNP    Subjective:   CHANGE CHIEF COMPLAINT      :[id number]}274}  Chief Complaint   Patient presents with    Hyperlipidemia     Patient reports to the clinic today for his 3 month follow up.    Health Maintenance     Care gaps addressed, patient would like to discuss colon cancer screening.    Sri Shaikh CMA     History of Present Illness    HPI:  Patient is a 45-year-old male previously prescribed Fenofibrate for low HDL and elevated triglycerides but did not initiate the medication or follow up for a period. In 2024, his lipid panel revealed a further decline in HDL, decreasing from 34 in  to 18, with triglycerides at 466. He was then instructed to start Fenofibrate and follow up in 3 months. He reports adherence to daily medication since then. He has lost 10 lbs and has been implementing lifestyle changes. His last lab results in 2024 also showed elevated blood sugar of 144 and an A1c of 5.8. He did not fast for today's labs as instructed, having consumed lunch before the appointment. He reports improvement in his restless leg syndrome symptoms since increasing the medication dosage.    He denies chest pain, shortness of breath, and anxiety.      ROS:  Respiratory: -shortness of breath  Cardiovascular: -chest pain  Neurological: +restless legs         Allergies and Meds: 274}     Review of patient's allergies indicates:  No Known Allergies     Current Outpatient Medications   Medication Sig Dispense Refill    cetirizine (ALLERGY RELIEF, CETIRIZINE,) 10 MG tablet Take 1 tablet (10 mg total) by mouth once daily. 90 tablet 3    EScitalopram oxalate (LEXAPRO) 20 MG tablet Take 1 tablet (20 mg total) by mouth once daily. 90 tablet  "3    fenofibrate 160 MG Tab Take 1 tablet (160 mg total) by mouth once daily. 90 tablet 3    pantoprazole (PROTONIX) 40 MG tablet Take 1 tablet (40 mg total) by mouth once daily. 90 tablet 3    pramipexole (MIRAPEX) 0.25 MG tablet Take 3 tablets (0.75 mg total) by mouth every evening. Take 2-3 hours before bedtime. 270 tablet 1     No current facility-administered medications for this visit.       Labs:274}   I have reviewed labs below:    Lab Results   Component Value Date    WBC 6.43 12/11/2024    RBC 5.03 12/11/2024    HGB 14.6 12/11/2024    HCT 44.8 12/11/2024     12/11/2024     (L) 11/20/2024    K 4.0 11/20/2024     11/20/2024    CALCIUM 9.0 11/20/2024     (H) 11/20/2024    BUN 19 11/20/2024    CREATININE 0.86 11/20/2024    ALT 23 11/20/2024    AST 23 11/20/2024    CHOL 120 11/20/2024    TRIG 466 (H) 11/20/2024    HDL 18 (L) 11/20/2024    LDLCALC 90 11/20/2023    TSH 1.623 12/11/2024    HGBA1C 5.8 11/20/2024       Medical History: 274}     Past Medical History:   Diagnosis Date    Anxiety 03/16/2018    Cervical spondylosis 06/10/2020    noted on CT neck    Dysphagia     GERD (gastroesophageal reflux disease) 02/26/2018    Globus sensation     History of alcohol abuse     History of COVID-19 12/17/2020    Other long term (current) drug therapy     Smokeless tobacco use       Tobacco Use History[1]   History reviewed. No pertinent surgical history.     Health Maintenance:      Health Maintenance Topics with due status: Not Due       Topic Last Completion Date    TETANUS VACCINE 03/24/2022    Hemoglobin A1c (Prediabetes) 11/20/2024    Lipid Panel 12/11/2024    RSV Vaccine (Age 60+ and Pregnant patients) Not Due       Objective:  274}   Vital Signs  Temp: 98.6 °F (37 °C)  Temp Source: Oral  Pulse: 61  Resp: 18  SpO2: 98 %  BP: 106/70  BP Location: Left arm  Patient Position: Sitting  Pain Score: 0-No pain  Height and Weight  Height: 6' 4" (193 cm)  Weight: 108.1 kg (238 lb 6.4 oz)  BSA " (Calculated - sq m): 2.41 sq meters  BMI (Calculated): 29  Weight in (lb) to have BMI = 25: 205    Over the last two weeks how often have you been bothered by little interest or pleasure in doing things: 0  Over the last two weeks how often have you been bothered by feeling down, depressed or hopeless: 0  PHQ-2 Total Score: 0    Wt Readings from Last 3 Encounters:   04/16/25 108.1 kg (238 lb 6.4 oz)   11/20/24 112.6 kg (248 lb 3.2 oz)   07/24/24 112.3 kg (247 lb 9.6 oz)     Physical Exam  Vitals and nursing note reviewed.   Constitutional:       General: He is not in acute distress.     Appearance: Normal appearance. He is not ill-appearing.   HENT:      Head: Normocephalic.   Eyes:      Conjunctiva/sclera: Conjunctivae normal.   Cardiovascular:      Rate and Rhythm: Normal rate and regular rhythm.      Heart sounds: Normal heart sounds.   Pulmonary:      Effort: Pulmonary effort is normal. No respiratory distress.      Breath sounds: Normal breath sounds.   Skin:     General: Skin is warm and dry.      Coloration: Skin is not jaundiced or pale.   Neurological:      Mental Status: He is alert and oriented to person, place, and time.      Gait: Gait normal.   Psychiatric:         Mood and Affect: Mood normal.         Behavior: Behavior normal.         Thought Content: Thought content normal.         Judgment: Judgment normal.          Assessment and Plan: 274}       1. Abnormally low high density lipoprotein (HDL) cholesterol with hypertriglyceridemia  Assessment & Plan:  Lab Results   Component Value Date    CHOL 120 11/20/2024    CHOL 154 11/20/2023    CHOL 148 03/24/2022     Lab Results   Component Value Date    HDL 18 (L) 11/20/2024    HDL 26 (L) 11/20/2023    HDL 34 (L) 03/24/2022     Lab Results   Component Value Date    LDLCALC 90 11/20/2023    LDLCALC 88 03/24/2022     Lab Results   Component Value Date    TRIG 466 (H) 11/20/2024    TRIG 190 (H) 11/20/2023    TRIG 129 03/24/2022       Lab Results   Component  Value Date    CHOLHDL 6.7 11/20/2024    CHOLHDL 5.9 11/20/2023    CHOLHDL 4.4 03/24/2022     Recommend starting fenofibrate & rx sent Dec 2023, lifestyle changes, 3 mth f/u HLD (due Feb 2024), didn't f/u for recheck lipids. Never started fenofibrate.    Nov 2024 with lower HDL and increasing trigs, strongly recommended fenofibrate which he is now taking daily and he is exercising with a 10 lb weight loss noted.    Recheck lipids today but he is not fasting.      Orders:  -     Comprehensive Metabolic Panel; Future; Expected date: 04/16/2025  -     Lipid Panel; Future; Expected date: 04/16/2025    2. Prediabetes  Overview:  Lab Results   Component Value Date    HGBA1C 5.8 11/20/2023    11/20/23 - NEW diagnosis.    Assessment & Plan:  Lab Results   Component Value Date    HGBA1C 5.8 11/20/2024    HGBA1C 5.8 11/20/2023     He has lost 10 lbs since previous visit.  Recheck A1c.    Orders:  -     Hemoglobin A1C; Future; Expected date: 04/16/2025    3. Other long term (current) drug therapy  -     Comprehensive Metabolic Panel; Future; Expected date: 04/16/2025    4. Encounter for colorectal cancer screening  -     Cologuard Screening (Multitarget Stool DNA); Future; Expected date: 04/16/2025    5. Anxiety  Assessment & Plan:  Stable, controlled  Continue Lexapro.      6. Gastroesophageal reflux disease without esophagitis    7. Restless legs  Assessment & Plan:  Improved and controlled on Mirapex 0.75 mg 2-3 hours before bedtime nightly          Assessment & Plan    IMPRESSION:  - HDL dropped from 34 in 2022 to 18 in November 2024, with triglycerides at 466, indicating worsening lipid profile.  - Previously prescribed Fenofibrate not started, now insisted on starting medication due to concerning lipid trends.  - Conducted non-fasting lipid panel and A1C due to difficulty in getting patient to return for fasting labs.  - Monitored glucose, as previous labs showed elevated sugar (144) and A1C (5.8).  - Evaluated  cardiovascular risk factors, including low HDL and high triglycerides.    LIPOPROTEIN DEFICIENCY AND HYPERGLYCERIDEMIA:  - Monitored the patient's HDL levels, which have decreased from 34 in 2022 to 18 in November 2024.  - Evaluated the patient's condition and expressed concern about the low HDL levels and their impact on stroke and heart attack risk.  - Explained the significance of low HDL (good cholesterol) and its association with increased risk of stroke and heart attack.  - Continued Fenofibrate 160 mg daily along with lifestyle changes.  - Ordered lipid panel (non-fasting).  - Monitored the patient's triglycerides, which were 466 in November 2024.  - Noted that triglycerides may still be high due to non-fasting state.  - Acknowledged the need for fasting before labs and difficulty in getting the patient to return to the office.    PREDIABETES:  - Monitored the patient's blood sugar, which was elevated at 144 in November, with an A1c of 5.8.  - Ordered A1c test due to high triglycerides and previously elevated sugar levels.  - Monitored the patient's A1c, which was 5.8 in November.  - Expressed concern about potential health issues.  - Ordered another A1c test.    RESTLESS LEGS SYNDROME:  - Inquired about the patient's restless legs symptoms.  - Evaluated the patient's response to treatment, noting improvement in restless leg symptoms after medication dose increase.  - Continued Mirapex 0.25 mg, 3 tablets 2-3 hours before bedtime for restless leg syndrome.    GASTRO-ESOPHAGEAL REFLUX DISEASE:  - Continued Pantoprazole 40 mg daily for GERD.    ANXIETY DISORDER:  - Inquired about the patient's anxiety.  - Continued Escitalopram 20 mg daily for anxiety.    ALLERGY:  - Continued Cetirizine 10 mg daily for allergies.    GENERAL HEALTH MANAGEMENT:  - Acknowledged the patient's efforts in weight loss and medication adherence.  - Patient to continue weight loss efforts and exercise regimen.  - Follow up on 12/01/25  for yearly wellness visit.  - Contact the office if any issues arise before the scheduled yearly wellness visit.           Signature:  SUMAN Pearce-BC    Future Appointments   Date Time Provider Department Center   12/1/2025  7:20 AM Antonietta Marte FNP [facility] [place]     This note was generated with the assistance of ambient listening technology. Verbal consent was obtained by the patient and accompanying visitor(s) for the recording of patient appointment to facilitate this note. I attest to having reviewed and edited the generated note for accuracy, though some syntax or spelling errors may persist. Please contact the author of this note for any clarification.           [1]   Social History  Tobacco Use   Smoking Status Never   Smokeless Tobacco Current    Types: Chew

## 2025-04-16 NOTE — ASSESSMENT & PLAN NOTE
Lab Results   Component Value Date    HGBA1C 5.8 11/20/2024    HGBA1C 5.8 11/20/2023     He has lost 10 lbs since previous visit.  Recheck A1c.

## 2025-04-27 ENCOUNTER — RESULTS FOLLOW-UP (OUTPATIENT)
Dept: FAMILY MEDICINE | Facility: CLINIC | Age: 45
End: 2025-04-27

## 2025-06-28 DIAGNOSIS — G25.81 RESTLESS LEGS: Chronic | ICD-10-CM

## 2025-06-30 RX ORDER — PRAMIPEXOLE DIHYDROCHLORIDE 0.25 MG/1
0.75 TABLET ORAL NIGHTLY
Qty: 270 TABLET | Refills: 1 | Status: SHIPPED | OUTPATIENT
Start: 2025-06-30